# Patient Record
Sex: MALE | Race: AMERICAN INDIAN OR ALASKA NATIVE
[De-identification: names, ages, dates, MRNs, and addresses within clinical notes are randomized per-mention and may not be internally consistent; named-entity substitution may affect disease eponyms.]

---

## 2018-03-07 ENCOUNTER — HOSPITAL ENCOUNTER (INPATIENT)
Dept: HOSPITAL 42 - ED | Age: 58
LOS: 1 days | Discharge: ANOTHER HOSPITAL | DRG: 270 | End: 2018-03-08
Attending: INTERNAL MEDICINE | Admitting: INTERNAL MEDICINE
Payer: SELF-PAY

## 2018-03-07 VITALS — BODY MASS INDEX: 20.9 KG/M2

## 2018-03-07 DIAGNOSIS — E11.9: ICD-10-CM

## 2018-03-07 DIAGNOSIS — A41.9: ICD-10-CM

## 2018-03-07 DIAGNOSIS — I46.9: ICD-10-CM

## 2018-03-07 DIAGNOSIS — I10: ICD-10-CM

## 2018-03-07 DIAGNOSIS — R57.9: ICD-10-CM

## 2018-03-07 DIAGNOSIS — I25.10: ICD-10-CM

## 2018-03-07 DIAGNOSIS — J96.91: ICD-10-CM

## 2018-03-07 DIAGNOSIS — I21.4: Primary | ICD-10-CM

## 2018-03-07 DIAGNOSIS — I49.01: ICD-10-CM

## 2018-03-07 LAB
ALBUMIN SERPL-MCNC: 4.7 G/DL (ref 3–4.8)
ALBUMIN/GLOB SERPL: 1.2 {RATIO} (ref 1.1–1.8)
ALT SERPL-CCNC: 150 U/L (ref 7–56)
APPEARANCE UR: (no result)
APTT BLD: 30 SECONDS (ref 25.1–36.5)
ARTERIAL BLOOD GAS O2 SAT: 100.4 % (ref 95–98)
ARTERIAL BLOOD GAS PCO2: 35 MM/HG (ref 35–45)
ARTERIAL BLOOD GAS TCO2: 23.8 MMOL.L (ref 22–28)
AST SERPL-CCNC: 152 U/L (ref 17–59)
BASOPHILS # BLD AUTO: 0.04 K/MM3 (ref 0–2)
BASOPHILS NFR BLD: 0.3 % (ref 0–3)
BILIRUB UR-MCNC: (no result) MG/DL
BNP SERPL-MCNC: 162 PG/ML (ref 0–450)
BUN SERPL-MCNC: 16 MG/DL (ref 7–21)
CALCIUM SERPL-MCNC: 10.1 MG/DL (ref 8.4–10.5)
CK MB SERPL-MCNC: 6.4 NG/ML (ref 0–3.6)
CK MB%: 1.4 % (ref 2.5–3)
COLOR UR: YELLOW
EOSINOPHIL # BLD: 0.1 10*3/UL (ref 0–0.7)
EOSINOPHIL NFR BLD: 0.9 % (ref 1.5–5)
ERYTHROCYTE [DISTWIDTH] IN BLOOD BY AUTOMATED COUNT: 13.9 % (ref 11.5–14.5)
GFR NON-AFRICAN AMERICAN: > 60
GLUCOSE UR STRIP-MCNC: NEGATIVE MG/DL
GRANULOCYTES # BLD: 10.31 10*3/UL (ref 1.4–6.5)
GRANULOCYTES NFR BLD: 69.6 % (ref 50–68)
HCO3 BLDA-SCNC: 22.7 MMOL/L (ref 21–28)
HDLC SERPL-MCNC: 40 MG/DL (ref 29–60)
HGB BLD-MCNC: 11.8 G/DL (ref 14–18)
INHALED O2 CONCENTRATION: 100 %
INR PPP: 1.08 (ref 0.93–1.08)
INR PPP: 1.09 (ref 0.93–1.08)
LDLC SERPL-MCNC: 109 MG/DL (ref 0–129)
LEUKOCYTE ESTERASE UR-ACNC: (no result) LEU/UL
LYMPHOCYTES # BLD: 3.6 10*3/UL (ref 1.2–3.4)
LYMPHOCYTES NFR BLD AUTO: 24 % (ref 22–35)
MCH RBC QN AUTO: 32.2 PG (ref 25–35)
MCHC RBC AUTO-ENTMCNC: 33.9 G/DL (ref 31–37)
MCV RBC AUTO: 94.8 FL (ref 80–105)
MONOCYTES # BLD AUTO: 0.8 10*3/UL (ref 0.1–0.6)
MONOCYTES NFR BLD: 5.2 % (ref 1–6)
PH BLDA: 7.42 [PH] (ref 7.35–7.45)
PH UR STRIP: 6 [PH] (ref 4.7–8)
PLATELET # BLD: 278 10^3/UL (ref 120–450)
PMV BLD AUTO: 10.7 FL (ref 7–11)
PO2 BLDA: 511 MM/HG (ref 80–100)
PROT UR STRIP-MCNC: 30 MG/DL
PROTHROMBIN TIME: 12.3 SECONDS (ref 9.4–12.5)
PROTHROMBIN TIME: 12.4 SECONDS (ref 9.4–12.5)
RBC # BLD AUTO: 3.67 10^6/UL (ref 3.5–6.1)
RBC # UR STRIP: (no result) /UL
SP GR UR STRIP: >= 1.03 (ref 1–1.03)
TROPONIN I SERPL-MCNC: 0.15 NG/ML
UROBILINOGEN UR STRIP-ACNC: 0.2 E.U./DL
WBC # BLD AUTO: 14.8 10^3/UL (ref 4.5–11)

## 2018-03-07 PROCEDURE — 0BH17EZ INSERTION OF ENDOTRACHEAL AIRWAY INTO TRACHEA, VIA NATURAL OR ARTIFICIAL OPENING: ICD-10-PCS | Performed by: EMERGENCY MEDICINE

## 2018-03-07 PROCEDURE — 5A1945Z RESPIRATORY VENTILATION, 24-96 CONSECUTIVE HOURS: ICD-10-PCS | Performed by: EMERGENCY MEDICINE

## 2018-03-07 RX ADMIN — PROPOFOL PRN MLS/HR: 10 INJECTION, EMULSION INTRAVENOUS at 22:26

## 2018-03-07 RX ADMIN — HEPARIN SODIUM SCH MLS/HR: 10000 INJECTION, SOLUTION INTRAVENOUS at 21:30

## 2018-03-07 RX ADMIN — AMIODARONE HYDROCHLORIDE SCH MLS/HR: 1.8 INJECTION, SOLUTION INTRAVENOUS at 21:17

## 2018-03-07 RX ADMIN — PROPOFOL PRN MLS/HR: 10 INJECTION, EMULSION INTRAVENOUS at 19:47

## 2018-03-07 NOTE — CT
EXAM:

  CT Head Without Intravenous Contrast



EXAM DATE/TIME:

  3/7/2018 8:04 PM



CLINICAL HISTORY:

  57 years old, male; Signs and symptoms; Other: Unresponsive; intubated patient



TECHNIQUE:

  Axial computed tomography images of the head/brain without intravenous 

contrast.  All CT scans at this facility use one or more dose reduction 

techniques, viz.: automated exposure control; ma/kV adjustment per patient size 

(including targeted exams where dose is matched to indication; i.e. head); or 

iterative reconstruction technique.

  Coronal and sagittal reformatted images were created and reviewed.



COMPARISON:

  There are no prior studies for comparison.



FINDINGS:

  Brain:  There is prominence of sulci gyri and ventricles. There is no midline 

shift. There is patchy decreased attenuation in periventricular white matter. 

There is focal area decreased attenuation in the right internal capsule. There 

are no focal masses. There are no focal hemorrhages. Gray-white differentiation 

is visualized.

  Ventricles:  See above.

  Bones: Cranial vault is intact.

  Soft tissues:  unremarkable

  Sinuses:  There is mucoperiosteal thickening in ethmoid and maxillary sinuses

  Ears and mastoids: Middle ears and mastoids are unremarkable.There is streak 

artifact from an earring

  Orbits:  Orbital contents are unremarkable.

  Airway: There is an air-fluid level in the posterior nasopharynx.



IMPRESSION:  Mild atrophy; patchy decreased attenuation in periventricular 

white matter suggests small vessel disease; age indeterminate ischemic changes 

enter the right internal capsule; no bleed



If there suspicion for acute stroke, MRI may be helpful

## 2018-03-07 NOTE — CP.PCM.HP
<Ashish Whalen - Last Filed: 03/07/18 22:16>





History of Present Illness





- History of Present Illness


History of Present Illness: 





57 year old male with past medical history of ?HTN and ?DM presents to the 

hospital by EMS after being found down by his neighbor. According to his 

neighbor, he was shoveling snow outside and noticed the patient had collapsed. 

EMS was called. EMS arrived after an unknown time and patient received CPR and 

1 shock. Patient achieved ROSC and was brought to the ED. In the ED, patient 

was in respiratory distress and intubated. Questionable medical history 

obtained from neighbor, no family at bedside.  





Review of systems unobtainable due to acute condition. 








Present on Admission





- Present on Admission


Any Indicators Present on Admission: No





Review of Systems





- Review of Systems


Systems not reviewed;Unavailable: Intubated





Past Patient History





- Past Social History


Smoking Status: Unknown If Ever Smoked





- CARDIAC


Hx Cardiac Disorders: Yes


Hx Hypertension: Yes





- ENDOCRINE/METABOLIC


Hx Endocrine Disorders: Yes


Hx Diabetes Mellitus Type 2: Yes





- PSYCHIATRIC


Hx Substance Use: No





Meds


Allergies/Adverse Reactions: 


 Allergies











Allergy/AdvReac Type Severity Reaction Status Date / Time


 


Unobtainable Allergy   Verified 03/07/18 19:50














Physical Exam





- Constitutional


Appears: In Acute Distress





- Head Exam


Head Exam: ATRAUMATIC, NORMAL INSPECTION, NORMOCEPHALIC





- Eye Exam


Eye Exam: PERRL





- ENT Exam


ENT Exam: Mucous Membranes Moist, Normal Exam





- Neck Exam


Neck exam: Positive for: Normal Inspection.  Negative for: Lymphadenopathy





- Respiratory Exam


Respiratory Exam: Clear to Auscultation Bilateral.  absent: Rales, Rhonchi, 

Wheezes


Additional comments: 





Intubated





- Cardiovascular Exam


Cardiovascular Exam: RRR, +S1, +S2





- GI/Abdominal Exam


GI & Abdominal Exam: Firm.  absent: Normal Bowel Sounds





- Extremities Exam


Extremities exam: Positive for: normal inspection.  Negative for: calf 

tenderness, pedal edema





- Neurological Exam


Additional comments: 





Sedated





- Skin


Skin Exam: Intact, Normal Color, Warm





Results





- Vital Signs


Recent Vital Signs: 





 Last Vital Signs











Temp  97.9 F   03/07/18 20:00


 


Pulse  107 H  03/07/18 20:00


 


Resp  22   03/07/18 20:00


 


BP  166/100 H  03/07/18 20:00


 


Pulse Ox  100   03/07/18 20:00














- Labs


Result Diagrams: 


 03/07/18 19:24





 03/07/18 19:24


Labs: 





 Laboratory Results - last 24 hr











  03/07/18 03/07/18 03/07/18





  19:24 19:24 19:24


 


WBC  14.8 H  


 


RBC  3.67  


 


Hgb  11.8 L  


 


Hct  34.8 L  


 


MCV  94.8  


 


MCH  32.2  


 


MCHC  33.9  


 


RDW  13.9  


 


Plt Count  278  


 


MPV  10.7  


 


Gran %  69.6 H  


 


Lymph % (Auto)  24.0  


 


Mono % (Auto)  5.2  


 


Eos % (Auto)  0.9 L  


 


Baso % (Auto)  0.3  


 


Gran #  10.31 H  


 


Lymph # (Auto)  3.6 H  


 


Mono # (Auto)  0.8 H  


 


Eos # (Auto)  0.1  


 


Baso # (Auto)  0.04  


 


PT   12.3 


 


INR   1.08 


 


APTT   30.0 


 


Sodium    145


 


Potassium    4.1


 


Chloride    104


 


Carbon Dioxide    21


 


Anion Gap    24 H


 


BUN    16


 


Creatinine    1.0


 


Est GFR ( Amer)    > 60


 


Est GFR (Non-Af Amer)    > 60


 


Random Glucose    167 H


 


Calcium    10.1


 


Total Bilirubin    0.4


 


AST    152 H


 


ALT    150 H


 


Alkaline Phosphatase    142 H


 


Lactate Dehydrogenase    1004 H


 


Total Creatine Kinase    443 H


 


CK-MB (CK-2)    6.4 H


 


CK-MB (CK-2) %    1.4 L


 


Troponin I    0.15 H*


 


Total Protein    8.6 H


 


Albumin    4.7


 


Globulin    3.9


 


Albumin/Globulin Ratio    1.2














Assessment & Plan





- Assessment and Plan (Free Text)


Plan: 





57 year old male with questionable medical history of HTN and DM presents with 

hypoxemic respiratory failure secondary to NSTEMI. Cardiologist recommends ASA, 

plavix, heparin, amiodarone, and nitroglycerin drip. We will admit patient to 

the ICU and continue to monitor patient closely. 





Neuro: 


Head CT shows mild atrophy with small vessel disease and indeterminate ischemic 

changes. 


Sedated





Cardiology: 


NSTEMI


Hemodynamically stable


Maintain MAP >65


ASA, plavix, heparin drip, amiodarone, and nitroglycerin drip


Labetalol prn for elevated BP


Trend tropins


Echocardiogram


Cardiology consulted


Urine tox, BNP, Magnesium, and Phosphorus ordered 


Lipid panel


ABG pending





Pulmonology: 


Intubated


Hypoxemic respiratory failure


Rapid flu pending


Maintain O2 sat >90


ABG in AM


CXR in AM





GI: 


Shock liver, will trend LFTs


Protonix


NPO





ID:


Afebrile


Leukocytosis likely secondary to stress reaction from NSTEMI


Procal ordered 


Maintain normothermia





Endocrine:


HgA1c ordered


Glucose levels stable


Maintain normoglycemia





Nephrology:


Maintain euvolemia 


Replenish electrolytes as needed





Marlee, PGY-2





<Rhonda BOOKER,Juan M - Last Filed: 03/08/18 06:04>





Results





- Vital Signs


Recent Vital Signs: 





 Last Vital Signs











Temp  100.4 F H  03/08/18 05:40


 


Pulse  77   03/08/18 05:40


 


Resp  17   03/08/18 02:05


 


BP  106/63   03/08/18 05:38


 


Pulse Ox  99   03/08/18 05:40














- Labs


Result Diagrams: 


 03/07/18 19:24





 03/07/18 19:24


Labs: 





 Laboratory Results - last 24 hr











  03/07/18 03/07/18 03/07/18





  23:42 23:42 23:45


 


APTT   


 


pO2   


 


VBG pH   


 


VBG pCO2   


 


VBG HCO3   


 


VBG Total CO2   


 


VBG O2 Sat (Calc)   


 


VBG Base Excess   


 


VBG Potassium   


 


Sodium   


 


Chloride   


 


Glucose   


 


Lactate   


 


FiO2   


 


Troponin I   


 


Venous Blood Potassium   


 


Urine Color  Yellow  


 


Urine Appearance  Sl cloudy  


 


Urine pH  6.0  


 


Ur Specific Gravity  >= 1.030  


 


Urine Protein  30 H  


 


Urine Glucose (UA)  Negative  


 


Urine Ketones  15 H  


 


Urine Blood  Trace-intact H  


 


Urine Nitrate  Negative  


 


Urine Bilirubin  Small H  


 


Urine Urobilinogen  0.2  


 


Ur Leukocyte Esterase  Trace H  


 


Urine RBC  0 - 2  


 


Urine WBC  1 - 3  


 


Ur Epithelial Cells  0 - 2  


 


Urine Other  Usperm  


 


Urine Opiates Screen    Positive H


 


Urine Methadone Screen    Negative


 


Ur Barbiturates Screen    Negative


 


Ur Phencyclidine Scrn    Negative


 


Ur Amphetamines Screen    Negative


 


U Benzodiazepines Scrn    Negative


 


U Oth Cocaine Metabols    Negative


 


U Cannabinoids Screen    Negative


 


Influenza Typ A,B (EIA)   Negative for flu a/b 














  03/08/18 03/08/18 03/08/18





  00:30 01:10 03:35


 


APTT    73.6 H


 


pO2   27 L 


 


VBG pH   7.34 


 


VBG pCO2   52.0 


 


VBG HCO3   28.1 H 


 


VBG Total CO2   29.7 H 


 


VBG O2 Sat (Calc)   55.9 


 


VBG Base Excess   1.4 


 


VBG Potassium   4.2 


 


Sodium   140.0 


 


Chloride   104.0 


 


Glucose   160 H 


 


Lactate   4.2 H* 


 


FiO2   21.0 


 


Troponin I  6.90 H* D  


 


Venous Blood Potassium   4.2 


 


Urine Color   


 


Urine Appearance   


 


Urine pH   


 


Ur Specific Gravity   


 


Urine Protein   


 


Urine Glucose (UA)   


 


Urine Ketones   


 


Urine Blood   


 


Urine Nitrate   


 


Urine Bilirubin   


 


Urine Urobilinogen   


 


Ur Leukocyte Esterase   


 


Urine RBC   


 


Urine WBC   


 


Ur Epithelial Cells   


 


Urine Other   


 


Urine Opiates Screen   


 


Urine Methadone Screen   


 


Ur Barbiturates Screen   


 


Ur Phencyclidine Scrn   


 


Ur Amphetamines Screen   


 


U Benzodiazepines Scrn   


 


U Oth Cocaine Metabols   


 


U Cannabinoids Screen   


 


Influenza Typ A,B (EIA)   














Attending/Attestation





- Attestation


I have personally seen and examined this patient.: Yes


I have fully participated in the care of the patient.: Yes


I have reviewed all pertinent clinical information: Yes


Notes (Text): 

















-I agree with the above H&P completed by the resident physician with the 

following additions and/or changes:








-The patient is a 57 year old man with a history of diabetes mellitus and HTN 

who collapsed while shoveling his driveway earlier this evening (time patient 

down unknown). The event was witnessed by his neighbor, who subsequently called 

EMS. Upon EMS arrival, the patient was unresponsive and found to be in 

ventricular fibrillation. He was shocked in the field with ROSC. Of note, 

because the patient is currently intubated and sedated (with no family or 

friends at bedside), details of the history are limited and reliant mostly on 

EMS/ED notes. In the ED, the patient developed respiratory distress and agonal 

breathing, requiring intubation. Initial EKG showed ST-depressions in anterior 

and lateral leads (with no prior EKGs available for comparison). Initial trop 

was elevated at 0.15. Per cardiology (Dr. Gipson) recommendations, treatment for 

NSTEMI was initiated (including Plavix and Heparin drip). Of note, CT-head 

result showing, 'ischemia of indeterminate age', was reviewed and confirmed by 

Dr. Palma (neurology). Consequently, due to concerns for risk of hemorrhagic 

conversion of this possibly acute ischemic CVA, the initial plan was to 

discontinue Heparin drip. However, because the patients 2nd troponin increased 

markedly from 0.15 to 6.90, suspicion for ACS remains high and Heparin drip was 

continued. Serial trops/EKGs, 2D-echo have all been ordered. In addition, due 

to the patient's initial reported V-fib in the field, Amioadrone drip has been 

started. Empiric IV antibiotics have also been started and procalicitonin and 

cultures pending. Overnight assistance by Dr. Gipson and Dr. Palma greatly 

appreciated.      

















Critical Care Time Spent: 90-120minutes

## 2018-03-07 NOTE — ED PDOC
Arrival/HPI





- General


Chief Complaint: Altered Mental Status


Time Seen by Provider: 03/07/18 19:57


Historian: EMS





- History of Present Illness


Narrative History of Present Illness (Text): 


03/07/18 20:00


Pradeep Manning is a 57 year old male who presents to the Emergency department 

brought in by EMS status post possible cardiac arrest. As per history, patient 

was shoveling snow and collapsed. EMS was notified, patient was defibrillated 

once and CPR was initiated. Patient apparently responded, as per paramedic. On 

arrival to the Emergency department, patient was unresponsive with agonal 

respirations. Patient was placed on cardiac monitor and was emergently 

intubated. Only other collateral info obtained is that of patient being 

hypertensive and diabetic. Limited HPI and ROS secondary to patient's condition.


Time/Duration: Prior to Arrival


Symptom Onset: Sudden


Symptom Course: Unchanged


Severity Level: Severe


Context: Other (Shoveling snow)





Past Medical History





- Provider Review


Nursing Documentation Reviewed: Yes





- Cardiac


Hx Cardiac Disorders: Yes


Hx Hypertension: Yes





- Endocrine/Metabolic


Hx Endocrine Disorders: Yes


Hx Diabetes Mellitus Type 2: Yes





- Psychiatric


Hx Substance Use: No





Family/Social History





- Physician Review


Nursing Documentation Reviewed: Yes


Family/Social History: Unknown Family HX


Smoking Status: Unknown If Ever Smoked


Hx Alcohol Use: No


Hx Substance Use: No





Allergies/Home Meds


Allergies/Adverse Reactions: 


Allergies





Unobtainable Allergy (Verified 03/07/18 19:50)


 











Review of Systems





- Review of Systems


Systems not reviewed;Unavailable: Acuity of Condition





Physical Exam


Vital Signs Reviewed: Yes


Vital Signs











  Temp Pulse Resp BP Pulse Ox


 


 03/07/18 21:38   109 H   187/89 H 


 


 03/07/18 20:00  97.9 F  107 H  22  166/100 H  100











Temperature: Afebrile


Blood Pressure: Hypertensive


Pulse: Regular


Respiratory Rate: Mechanically Ventilated


Mental Status: Positive for: other (Sedated)





- Systems Exam


Head: Present: Atraumatic, Normocephalic


Pupils: Present: PERRL


Conjunctiva: Present: Normal


Mouth: Present: Moist Mucous Membranes


Pharnyx: Present: Other (ET tube in oropharynx)


Neck: Present: Normal Range of Motion, Other (Supple).  No: Meningeal Signs, 

MIDLINE TENDERNESS, Paraspinal Tenderness


Respiratory/Chest: Present: Clear to Auscultation, Good Air Exchange, Other (

Mechanically ventilated)


Cardiovascular: Present: Regular Rate and Rhythm, Normal S1, S2.  No: Murmurs


Abdomen: Present: Normal Bowel Sounds.  No: Tenderness, Distention, Peritoneal 

Signs


Upper Extremity: No: Cyanosis, Edema


Lower Extremity: No: Edema


Neurological: Present: Other (Sedated currently)


Skin: Present: Warm, Dry, Normal Color.  No: Rashes


Psychiatric: Present: Other (Sedated)





Medical Decision Making


ED Course and Treatment: 


03/07/18 20:00


Impression:


57 year old male brought in by EMS following possible cardiac arrest.





Plan:


-- CT Head w/o contrast


-- EKG


-- CXR


-- Labs, cardiac enzymes


-- Aspirin


-- Etomidate


-- Nitroglycerin


-- Reassess and disposition





Progress Notes:


Pt defibrillated once in the field. CPR initiated by EMS. As per paramedic pt 

responded to efforts. On arrival, pt unresponsive, placed on cardiac monitior, 

and emergently intubated. Pt moving all extremities, was sedated, as pt was 

agitated following intubation. Pt vitals remain stable. 





EKG performed, ST at 107 bpm. ST/T wave depressions anteriorly and inferioly. 

Isolated ST elevation. AVR. Sent to Dr. Gipson, cardiologist, states pt is NSTEMI 

at this time. Medications were ordered as per discussion with Dr. Gipson.





03/07/18 20:05


PROCEDURE: INTUBATION


Performed by the emergency provider


Consent: Discussion of the risks, benefits, and alternatives to the procedure, 

along with informed


consent was precluded by the urgency of the procedure and the patient condition.


Timeout: A timeout to verify the correct patient, procedure, and site was 

performed.


Indication: Agonal respirations


Pre-oxygenation: Bag-valve-mask


Medications: Etomidate. See MAR for details.


ETT Size: 7.5


Confirmation: Cords directly visualized as tube passed, good bilateral breath 

sounds, positive CO2


detector color change, tube fogging, adequate chest rise, improving pulse 

oximetry reading, improved


skin color, and absence of gastric sounds,.


ETT Secured: The cuff was inflated and the tube was secured appropriately at a 

distance of 23 cm at


the lip.


Post-Procedure: There were no immediate complications.


CXR Confirmation: Yes





Chest X-ray reviewed, ET tube above the sally, no acute processes.





03/07/18 21:51


Case discussed with medical resident and Dr. Ellis, intensivist. Pt will be 

admitted to cardiac care unit for further management. Dr. Ellis and medical 

resident in ER to evaluate pt.





- Lab Interpretations


Lab Results: 








 03/07/18 19:24 





 03/07/18 19:24 





 Lab Results





03/07/18 21:50: pCO2 35, pO2 511.0 H, HCO3 22.7, ABG pH 7.42, ABG Total CO2 23.8

, ABG O2 Saturation 100.4 H, ABG Base Excess -1.3, ABG Potassium 3.6, Glucose 

230 H, Lactate 2.8 H, Mechanical Rate 14, FiO2 100.0, Tidal Volume 600, PEEP 5, 

Sodium 140.0, Chloride 109.0 H, Arterial Blood Potassium 3.6


03/07/18 21:30: Ammonia 29


03/07/18 21:30: TSH 3rd Generation 1.15, Alcohol, Quantitative < 10


03/07/18 21:30: Phosphorus 2.1 L, Magnesium 2.0, NT-Pro-B Natriuret Pep 162, 

Triglycerides 110, Cholesterol 165, LDL Cholesterol Direct 109, HDL Cholesterol 

40


03/07/18 21:30: PT 12.4, INR 1.09 H


03/07/18 19:24: Sodium 145, Potassium 4.1, Chloride 104, Carbon Dioxide 21, 

Anion Gap 24 H, BUN 16, Creatinine 1.0, Est GFR (African Amer) > 60, Est GFR (

Non-Af Amer) > 60, Random Glucose 167 H, Calcium 10.1, Total Bilirubin 0.4, AST 

152 H,  H, Alkaline Phosphatase 142 H, Lactate Dehydrogenase 1004 H, 

Total Creatine Kinase 443 H, CK-MB (CK-2) 6.4 H, CK-MB (CK-2) % 1.4 L, Troponin 

I 0.15 H*, Total Protein 8.6 H, Albumin 4.7, Globulin 3.9, Albumin/Globulin 

Ratio 1.2


03/07/18 19:24: PT 12.3, INR 1.08, APTT 30.0


03/07/18 19:24: WBC 14.8 H, RBC 3.67, Hgb 11.8 L, Hct 34.8 L, MCV 94.8, MCH 32.2

, MCHC 33.9, RDW 13.9, Plt Count 278, MPV 10.7, Gran % 69.6 H, Lymph % (Auto) 

24.0, Mono % (Auto) 5.2, Eos % (Auto) 0.9 L, Baso % (Auto) 0.3, Gran # 10.31 H, 

Lymph # (Auto) 3.6 H, Mono # (Auto) 0.8 H, Eos # (Auto) 0.1, Baso # (Auto) 0.04








I have reviewed the lab results: Yes





- RAD Interpretation


Radiology Orders: 








03/07/18 19:57


CHEST PORTABLE [RAD] Stat 





03/07/18 20:04


HEAD W/O CONTRAST [CT] Stat 





03/08/18 05:00


CHEST PORTABLE [RAD] DAILY 











: ED Physician





- EKG Interpretation


Interpreted by ED Physician: Yes


Type: 12 lead EKG





- Medication Orders


Current Medication Orders: 








Aspirin (Aspirin Chewable)  81 mg PO DAILY JOYCELYN


Nitroglycerin/Dextrose (Nitroglycerin 50 Mg/250 Ml D5w)  50 mg in 250 mls @ 1.5 

mls/hr IV .Q24H PRN; Protocol; 5 MCG/MIN


   PRN Reason: Other


   Last Admin: 03/07/18 21:38  Dose: 1.5 mls/hr





eMAR Start Stop


 Document     03/07/18 21:38  RD  (Rec: 03/07/18 22:10  RD  [email or web address])


     Intravenous Solution


      Start Date                                 03/07/18


      Start Time                                 21:38


MAR Pulse and Blood Pressure


 Document     03/07/18 21:38  RD  (Rec: 03/07/18 22:10  RD  [email or web address])


     Pulse


      Pulse Rate (60-90 beats/min)               109


     Blood Pressure


      Blood Pressure (100//90 mm Hg)       187/89





Heparin Sodium/Sodium Chloride (Heparin 17970 Units/250ml 1/2 Normal Saline)  25

,000 units in 250 mls @ 8.437 mls/hr IV .Q24H JOYCELYN; 12 UNITS/KG/HR


   PRN Reason: Protocol


   Last Admin: 03/07/18 21:30  Dose: 8.437 mls/hr





eMAR Start Stop


 Document     03/07/18 21:30  RD  (Rec: 03/07/18 22:12  RD  [email or web address])


     Intravenous Solution


      Start Date                                 03/07/18


      Start Time                                 21:30


MAR aPTT


 Document     03/07/18 21:30  RD  (Rec: 03/07/18 22:12  RD  [email or web address])


     aPTT


      aPTT (secs)                                30





Amiodarone HCl/Dextrose (Nexterone 360 Mg In D5w 200 Ml (Premix))  360 mg in 

200 mls @ 33.333 mls/hr IV .Q6H JOYCELYN; 1 MG/MIN


   PRN Reason: Protocol


   Last Admin: 03/07/18 21:17  Dose: 33.333 mls/hr





eMAR Start Stop


 Document     03/07/18 21:17  RD  (Rec: 03/07/18 22:13  RD  [email or web address])


     Intravenous Solution


      Start Date                                 03/07/18


      Start Time                                 21:17





Potassium Phosphate 15 mmole/ (Sodium Chloride)  255 mls @ 42.5 mls/hr IVPB 

ONCE ONE


   Stop: 03/08/18 03:50


Sodium Chloride (Sodium Chloride 0.9%)  1,000 mls @ 100 mls/hr IV .Q10H JOYCELYN


Propofol (Diprivan)  1,000 mg in 100 mls @ 1.953 mls/hr IV .Q24H PRN; Protocol; 

5 MCG/KG/MIN


   PRN Reason: TITRATE PER MD ORDER


   Last Admin: 03/07/18 22:26  Dose: 5.858 mls/hr


   Comments: Titration dose clarified with ANITA Gallagher. MD aware of titration.





eMAR Start Stop


 Document     03/07/18 22:26  RD  (Rec: 03/07/18 22:29  RD  [email or web address])


     Intravenous Solution


      Start Date                                 03/07/18


      Start Time                                 22:26


Pisano Agitation Sedation


 Document     03/07/18 22:26  RD  (Rec: 03/07/18 22:29  RD  [email or web address])


     Pisano Agitation Sedation Scale


      Pisano Agitation Sedation Scale Score    +3 Very Agitated: Pulls or


                                                 removes tube(s) or catheter(s)


                                                 ; aggressive





Insulin Human Lispro (Humalog Low)  0 units SC ACHS JOYCELYN


   PRN Reason: Protocol


Labetalol HCl (Trandate)  5 mg IV Q6H PRN


   PRN Reason: SBP > 160


Metoprolol Tartrate (Lopressor)  50 mg NG BID JOYCELYN


Pantoprazole Sodium (Protonix Inj)  40 mg IVP DAILY JOYCELYN





Discontinued Medications





Aspirin (Aspirin)  325 mg NG ONCE STA


   Stop: 03/07/18 20:12


   Last Admin: 03/07/18 20:50  Dose: 325 mg





Clopidogrel Bisulfate (Plavix)  300 mg NG STAT STA


   Stop: 03/07/18 20:12


   Last Admin: 03/07/18 20:51  Dose: 300 mg





Etomidate (Amidate)  20 mg IVP STAT STA


   Stop: 03/07/18 20:06


Heparin Sodium (Porcine) (Heparin)  5,000 units IV ONCE STA


   PRN Reason: Protocol


   Stop: 03/07/18 20:13


   Last Admin: 03/07/18 21:27  Dose: 5,000 units





eMAR Start Stop


 Document     03/07/18 21:27  RD  (Rec: 03/07/18 21:42  RD  [email or web address])


     Intravenous Solution


      Start Date                                 03/07/18


      Start Time                                 21:27


      End Date                                   03/07/18


      End time                                   21:29


      Total Infusion Time                        2


MAR aPTT


 Document     03/07/18 21:27  RD  (Rec: 03/07/18 21:42  RD  [email or web address])


     aPTT


      aPTT (secs)                                30.0





Amiodarone HCl/Dextrose (Nexterone 150 Mg In Dextrose 100 Ml (Premix))  150 mg 

in 100 mls @ 600 mls/hr IVPB ONCE ONE


   PRN Reason: Protocol


   Stop: 03/07/18 20:22


   Last Admin: 03/07/18 21:05  Dose: 600 mls/hr





eMAR Start Stop


 Document     03/07/18 21:05  RD  (Rec: 03/07/18 22:11  RD  [email or web address])


     Intravenous Solution


      Start Date                                 03/07/18


      Start Time                                 21:05


      End Date                                   03/07/18


      End time                                   21:15


      Total Infusion Time                        10





Ketamine HCl (Ketalar)  70 mg IV ONCE ONE


   Stop: 03/07/18 20:06


Metoprolol Tartrate (Lopressor)  50 mg NG STAT STA


   Stop: 03/07/18 22:21











- Scribe Statement


The provider has reviewed the documentation as recorded by the Irais Finnegan





Provider Scribe Attestation:


All medical record entries made by the Scribe were at my direction and 

personally dictated by me. I have reviewed the chart and agree that the record 

accurately reflects my personal performance of the history, physical exam, 

medical decision making, and the department course for this patient. I have 

also personally directed, reviewed, and agree with the discharge instructions 

and disposition.








Disposition/Present on Arrival





- Present on Arrival


Any Indicators Present on Arrival: No


History of DVT/PE: No


History of Uncontrolled Diabetes: No


Urinary Catheter: No


History of Decub. Ulcer: No


History Surgical Site Infection Following: None





- Disposition


Have Diagnosis and Disposition been Completed?: Yes


Diagnosis: 


 Cardiac arrest, Acute coronary syndrome





Disposition: HOSPITALIZED


Disposition Time: 22:48


Patient Plan: Admission


Condition: CRITICAL


Forms:  [employer] (English)

## 2018-03-08 VITALS — TEMPERATURE: 100.2 F | RESPIRATION RATE: 15 BRPM

## 2018-03-08 VITALS — SYSTOLIC BLOOD PRESSURE: 176 MMHG | DIASTOLIC BLOOD PRESSURE: 59 MMHG

## 2018-03-08 VITALS — HEART RATE: 66 BPM

## 2018-03-08 VITALS — OXYGEN SATURATION: 100 %

## 2018-03-08 LAB
ALBUMIN SERPL-MCNC: 3.8 G/DL (ref 3–4.8)
ALBUMIN/GLOB SERPL: 1.1 {RATIO} (ref 1.1–1.8)
ALT SERPL-CCNC: 131 U/L (ref 7–56)
APTT BLD: 66.9 SECONDS (ref 25.1–36.5)
ARTERIAL BLOOD GAS HEMOGLOBIN: 11.5 G/DL (ref 11.7–17.4)
ARTERIAL BLOOD GAS HEMOGLOBIN: 11.7 G/DL (ref 11.7–17.4)
ARTERIAL BLOOD GAS O2 SAT: 94.2 % (ref 95–98)
ARTERIAL BLOOD GAS O2 SAT: 99.8 % (ref 95–98)
ARTERIAL BLOOD GAS PCO2: 27 MM/HG (ref 35–45)
ARTERIAL BLOOD GAS PCO2: 32 MM/HG (ref 35–45)
ARTERIAL BLOOD GAS TCO2: 23.2 MMOL.L (ref 22–28)
ARTERIAL BLOOD GAS TCO2: 23.4 MMOL.L (ref 22–28)
AST SERPL-CCNC: 172 U/L (ref 17–59)
BASE EXCESS BLDV CALC-SCNC: 1.4 MMOL/L (ref 0–2)
BUN SERPL-MCNC: 19 MG/DL (ref 7–21)
CALCIUM SERPL-MCNC: 9.8 MG/DL (ref 8.4–10.5)
EPI CELLS #/AREA URNS HPF: (no result) /HPF (ref 0–5)
ERYTHROCYTE [DISTWIDTH] IN BLOOD BY AUTOMATED COUNT: 14.1 % (ref 11.5–14.5)
GFR NON-AFRICAN AMERICAN: > 60
HCO3 BLDA-SCNC: 22.2 MMOL/L (ref 21–28)
HCO3 BLDA-SCNC: 22.6 MMOL/L (ref 21–28)
HEPATITIS A IGM: NEGATIVE
HEPATITIS B CORE AB: NEGATIVE
HEPATITIS C ANTIBODY: NEGATIVE
HGB BLD-MCNC: 12 G/DL (ref 14–18)
INHALED O2 CONCENTRATION: 50 %
INHALED O2 CONCENTRATION: 50 %
INR PPP: 1.21 (ref 0.93–1.08)
MCH RBC QN AUTO: 29.8 PG (ref 25–35)
MCHC RBC AUTO-ENTMCNC: 32.3 G/DL (ref 31–37)
MCV RBC AUTO: 92.1 FL (ref 80–105)
O2 CAP BLDA-SCNC: 15.7 ML/DL (ref 16–24)
O2 CAP BLDA-SCNC: 16.6 ML/DL (ref 16–24)
O2 CT BLDA-SCNC: 14.8 ML/DL (ref 15–23)
O2 CT BLDA-SCNC: 16.6 ML/DL (ref 15–23)
PH BLDA: 7.45 [PH] (ref 7.35–7.45)
PH BLDA: 7.53 [PH] (ref 7.35–7.45)
PH BLDV: 7.34 [PH] (ref 7.32–7.43)
PLATELET # BLD: 343 10^3/UL (ref 120–450)
PMV BLD AUTO: 10.9 FL (ref 7–11)
PO2 BLDA: 227 MM/HG (ref 80–100)
PO2 BLDA: 54 MM/HG (ref 80–100)
PROTHROMBIN TIME: 14 SECONDS (ref 9.4–12.5)
RBC # BLD AUTO: 4.03 10^6/UL (ref 3.5–6.1)
RBC #/AREA URNS HPF: (no result) /HPF (ref 0–2)
VENOUS BLOOD FIO2: 21 %
VENOUS BLOOD GAS PCO2: 52 (ref 40–60)
VENOUS BLOOD GAS PO2: 27 MM/HG (ref 30–55)
WBC # BLD AUTO: 21.2 10^3/UL (ref 4.5–11)

## 2018-03-08 PROCEDURE — B2111ZZ FLUOROSCOPY OF MULTIPLE CORONARY ARTERIES USING LOW OSMOLAR CONTRAST: ICD-10-PCS | Performed by: INTERNAL MEDICINE

## 2018-03-08 PROCEDURE — B2131ZZ FLUOROSCOPY OF MULTIPLE CORONARY ARTERY BYPASS GRAFTS USING LOW OSMOLAR CONTRAST: ICD-10-PCS | Performed by: INTERNAL MEDICINE

## 2018-03-08 PROCEDURE — B2151ZZ FLUOROSCOPY OF LEFT HEART USING LOW OSMOLAR CONTRAST: ICD-10-PCS | Performed by: INTERNAL MEDICINE

## 2018-03-08 PROCEDURE — 5A02210 ASSISTANCE WITH CARDIAC OUTPUT USING BALLOON PUMP, CONTINUOUS: ICD-10-PCS | Performed by: INTERNAL MEDICINE

## 2018-03-08 PROCEDURE — 4A023N7 MEASUREMENT OF CARDIAC SAMPLING AND PRESSURE, LEFT HEART, PERCUTANEOUS APPROACH: ICD-10-PCS | Performed by: INTERNAL MEDICINE

## 2018-03-08 RX ADMIN — PIPERACILLIN AND TAZOBACTAM SCH MLS/HR: 3; .375 INJECTION, POWDER, LYOPHILIZED, FOR SOLUTION INTRAVENOUS; PARENTERAL at 06:49

## 2018-03-08 RX ADMIN — AMIODARONE HYDROCHLORIDE SCH MLS/HR: 1.8 INJECTION, SOLUTION INTRAVENOUS at 02:33

## 2018-03-08 RX ADMIN — PROPOFOL PRN MLS/HR: 10 INJECTION, EMULSION INTRAVENOUS at 20:39

## 2018-03-08 RX ADMIN — INSULIN LISPRO SCH: 100 INJECTION, SOLUTION INTRAVENOUS; SUBCUTANEOUS at 06:00

## 2018-03-08 RX ADMIN — INSULIN LISPRO SCH: 100 INJECTION, SOLUTION INTRAVENOUS; SUBCUTANEOUS at 12:00

## 2018-03-08 RX ADMIN — INSULIN LISPRO SCH: 100 INJECTION, SOLUTION INTRAVENOUS; SUBCUTANEOUS at 18:38

## 2018-03-08 RX ADMIN — CEFEPIME SCH MLS/HR: 1 INJECTION, SOLUTION INTRAVENOUS at 09:43

## 2018-03-08 RX ADMIN — PIPERACILLIN AND TAZOBACTAM SCH MLS/HR: 3; .375 INJECTION, POWDER, LYOPHILIZED, FOR SOLUTION INTRAVENOUS; PARENTERAL at 02:00

## 2018-03-08 RX ADMIN — PROPOFOL PRN MLS/HR: 10 INJECTION, EMULSION INTRAVENOUS at 18:24

## 2018-03-08 RX ADMIN — PROPOFOL PRN MLS/HR: 10 INJECTION, EMULSION INTRAVENOUS at 18:26

## 2018-03-08 RX ADMIN — CEFEPIME SCH: 1 INJECTION, SOLUTION INTRAVENOUS at 17:41

## 2018-03-08 RX ADMIN — AMIODARONE HYDROCHLORIDE SCH MLS/HR: 1.8 INJECTION, SOLUTION INTRAVENOUS at 08:23

## 2018-03-08 RX ADMIN — PROPOFOL PRN MLS/HR: 10 INJECTION, EMULSION INTRAVENOUS at 03:45

## 2018-03-08 RX ADMIN — PROPOFOL PRN MLS/HR: 10 INJECTION, EMULSION INTRAVENOUS at 18:54

## 2018-03-08 RX ADMIN — HEPARIN SODIUM SCH MLS/HR: 10000 INJECTION, SOLUTION INTRAVENOUS at 20:41

## 2018-03-08 NOTE — CP.[email or web address].CON
History of Present Illness





- History of Present Illness


History of Present Illness: 





Neuro Consult note: 





57 year old male with PMHx of presumed HTN and DM? presents to the ED following 

being found outside by a neighbor shoveling snow. HPI obtained from prior notes 

and nursing staff as patient is intubated. As per neighbor he was shoveling his 

snow when he noticed the patient had collapsed. At this point EMS was called. 

Downtime was unknown and patient received CPR and 1 shock. At this point ROSC 

was achieved and he was brought to the ED. In the ED patient was found to be in 

respiratory distress and was intubated. CT of the head showed ischemic changes 

in the T internal capsule no bleed. Neuro was consulted. 





12 point ROS unobtainable as patient is intubated. 








 





Review of Systems





- Review of Systems


All systems: reviewed and no additional remarkable complaints except





Past Patient History





- Past Social History


Smoking Status: Unknown





- CARDIAC


Hx Cardiac Disorders: Yes (s/p Cardiac Arrest 03/07/18.)


Hx Cardia Arrhythmia: Yes (s/p Debrillation 03/07/18.)


Hx Hypertension: Yes (Hypertensive on admission 03/07/18.)





- PULMONARY


Hx Respiratory Disorders:  (Unknown.)





- NEUROLOGICAL


Hx Neurological Disorder: Yes (03/07/18 CT Head suggests small vessel disease.)





- HEENT


Hx HEENT Problems:  (Unknown)





- RENAL


Hx Chronic Kidney Disease:  (Unknown)





- ENDOCRINE/METABOLIC


Hx Endocrine Disorders: Yes


Hx Diabetes Mellitus Type 2: Yes





- HEMATOLOGICAL/ONCOLOGICAL


Hx Blood Disorders:  (Unknown)





- INTEGUMENTARY


Hx Dermatological Problems:  (Unknown)





- MUSCULOSKELETAL/RHEUMATOLOGICAL


Hx Musculoskeletal Disorders:  (Unknown)


Hx Falls: Yes (Current. Found down and unresponsive.)





- GASTROINTESTINAL


Hx Gastrointestinal Disorders:  (Unknown)





- GENITOURINARY/GYNECOLOGICAL


Hx Genitourinary Disorders:  (Unknown)





- PSYCHIATRIC


Hx Psychophysiologic Disorder:  (Unknown)


Hx Substance Use: No





- SURGICAL HISTORY


Hx Surgeries: Yes (Lower abdominal surgery (Lower abd midline healed scar noted)

.)





Meds


Allergies/Adverse Reactions: 


 Allergies











Allergy/AdvReac Type Severity Reaction Status Date / Time


 


Unobtainable Allergy   Verified 03/07/18 19:50














- Medications


Medications: 


 Current Medications





Aspirin (Aspirin Chewable)  81 mg PO DAILY [facility]


   Last Admin: 03/08/18 09:17 Dose:  81 mg


Clopidogrel Bisulfate (Plavix)  75 mg PO DAILY [facility]


   Last Admin: 03/08/18 09:18 Dose:  75 mg


Nitroglycerin/Dextrose (Nitroglycerin 50 Mg/250 Ml D5w)  50 mg in 250 mls @ 1.5 

mls/hr IV .Q24H PRN; Protocol; 5 MCG/MIN


   PRN Reason: Other


   Last Titration: 03/08/18 04:30 Dose:  0 mcg/min, 0 mls/hr


Heparin Sodium/Sodium Chloride (Heparin 63233 Units/250ml 1/2 Normal Saline)  25

,000 units in 250 mls @ 8.437 mls/hr IV .Q24H JOYCELYN; 12 UNITS/KG/HR


   PRN Reason: Protocol


   Last Admin: 03/07/18 21:30 Dose:  8.437 mls/hr


Amiodarone HCl/Dextrose (Nexterone 360 Mg In D5w 200 Ml (Premix))  360 mg in 

200 mls @ 33.333 mls/hr IV .Q6H JOYCELYN; 1 MG/MIN


   PRN Reason: Protocol


   Last Admin: 03/08/18 08:23 Dose:  16.7 mls/hr


Sodium Chloride (Sodium Chloride 0.9%)  1,000 mls @ 100 mls/hr IV .Q10H JOYCELYN


   Last Admin: 03/08/18 02:17 Dose:  100 mls/hr


Propofol (Diprivan)  1,000 mg in 100 mls @ 1.953 mls/hr IV .Q24H PRN; Protocol; 

5 MCG/KG/MIN


   PRN Reason: TITRATE PER MD ORDER


   Last Titration: 03/08/18 05:45 Dose:  35 mcg/kg/min, 13.669 mls/hr


Cefepime HCl (Maxipime 1gm)  1 gm in 100 mls @ 100 mls/hr IVPB Q8 JOYCELYN


   PRN Reason: Protocol


   Last Admin: 03/08/18 09:43 Dose:  100 mls/hr


Vancomycin HCl (Vancomycin 1gm)  1 gm in 250 mls @ 167 mls/hr IVPB Q12H JOYCELYN


   PRN Reason: Protocol


   Last Admin: 03/08/18 10:56 Dose:  167 mls/hr


Insulin Human Lispro (Humalog Low)  0 units SC Q6 JOYCELYN


   PRN Reason: Protocol


   Last Admin: 03/08/18 06:00 Dose:  Not Given


Labetalol HCl (Trandate)  5 mg IV Q6H PRN


   PRN Reason: SBP > 160


Metoprolol Tartrate (Lopressor)  50 mg NG BID [facility]


   Last Admin: 03/08/18 10:35 Dose:  Not Given


Pantoprazole Sodium (Protonix Inj)  40 mg IVP DAILY [facility]


   Last Admin: 03/08/18 09:29 Dose:  40 mg











Physical Exam





- Constitutional


Appears: No Acute Distress





- Head Exam


Head Exam: ATRAUMATIC, NORMOCEPHALIC





- Eye Exam


Eye Exam: EOMI, PERRL


Pupil Exam: NORMAL ACCOMODATION





- ENT Exam


ENT Exam: Mucous Membranes Moist





- Respiratory Exam


Respiratory Exam: Clear to Auscultation Bilateral.  absent: Rales, Wheezes





- Cardiovascular Exam


Cardiovascular Exam: REGULAR RHYTHM, RRR, +S1, +S2





- GI/Abdominal Exam


GI & Abdominal Exam: Normal Bowel Sounds, Soft.  absent: Tenderness





- Neurological Exam


Additional comments: 





Off sedation: + dolls eyes, corneal and gag reflexes intact, moves all 

extremities to pain, Pupils reactive but sluggish to light  





- Expanded Neurological Exam


  ** Expanded


Cranial nerves: Gag Reflex: Normal


Neuro motor strength exam: Left Upper Extremity: 4, Right Upper Extremity: 4, 

Left Lower Extremity: 4, Right Lower Extremity: 4


Coma Scale Eye Opening: To Voice


Coma Scale Motor Response: Localizes to Pain, Withdraws to Pain


Coma Scale Verbal: None


Coma Scale Total: 13





Results





- Vital Signs


Recent Vital Signs: 


 Last Vital Signs











Temp  99.3 F   03/08/18 09:00


 


Pulse  70   03/08/18 10:35


 


Resp  14   03/08/18 07:47


 


BP  99/60 L  03/08/18 10:35


 


Pulse Ox  99   03/08/18 09:00














- Labs


Result Diagrams: 


 03/08/18 05:30





 03/08/18 05:30


Labs: 


 Laboratory Results - last 24 hr











  03/07/18 03/07/18 03/07/18





  23:42 23:42 23:45


 


WBC   


 


RBC   


 


Hgb   


 


Hct   


 


MCV   


 


MCH   


 


MCHC   


 


RDW   


 


Plt Count   


 


MPV   


 


PT   


 


INR   


 


APTT   


 


pCO2   


 


pO2   


 


HCO3   


 


ABG pH   


 


ABG Total CO2   


 


ABG O2 Saturation   


 


ABG O2 Content   


 


ABG Base Excess   


 


ABG Hemoglobin   


 


ABG Carboxyhemoglobin   


 


POC ABG HHb (Measured)   


 


ABG Methemoglobin   


 


ABG O2 Capacity   


 


VBG pH   


 


VBG pCO2   


 


VBG HCO3   


 


VBG Total CO2   


 


VBG O2 Sat (Calc)   


 


VBG Base Excess   


 


VBG Potassium   


 


Hgb O2 Saturation   


 


Sodium   


 


Chloride   


 


Glucose   


 


Lactate   


 


FiO2   


 


Potassium   


 


Carbon Dioxide   


 


Anion Gap   


 


BUN   


 


Creatinine   


 


Est GFR ( Amer)   


 


Est GFR (Non-Af Amer)   


 


POC Glucose (mg/dL)   


 


Random Glucose   


 


Calcium   


 


Phosphorus   


 


Magnesium   


 


Total Bilirubin   


 


AST   


 


ALT   


 


Alkaline Phosphatase   


 


Troponin I   


 


Total Protein   


 


Albumin   


 


Globulin   


 


Albumin/Globulin Ratio   


 


Venous Blood Potassium   


 


Urine Color  Yellow  


 


Urine Appearance  Sl cloudy  


 


Urine pH  6.0  


 


Ur Specific Gravity  >= 1.030  


 


Urine Protein  30 H  


 


Urine Glucose (UA)  Negative  


 


Urine Ketones  15 H  


 


Urine Blood  Trace-intact H  


 


Urine Nitrate  Negative  


 


Urine Bilirubin  Small H  


 


Urine Urobilinogen  0.2  


 


Ur Leukocyte Esterase  Trace H  


 


Urine RBC  0 - 2  


 


Urine WBC  1 - 3  


 


Ur Epithelial Cells  0 - 2  


 


Urine Other  Usperm  


 


Urine Opiates Screen    Positive H


 


Urine Methadone Screen    Negative


 


Ur Barbiturates Screen    Negative


 


Ur Phencyclidine Scrn    Negative


 


Ur Amphetamines Screen    Negative


 


U Benzodiazepines Scrn    Negative


 


U Oth Cocaine Metabols    Negative


 


U Cannabinoids Screen    Negative


 


Influenza Typ A,B (EIA)   Negative for flu a/b 














  03/08/18 03/08/18 03/08/18





  00:30 01:10 03:35


 


WBC   


 


RBC   


 


Hgb   


 


Hct   


 


MCV   


 


MCH   


 


MCHC   


 


RDW   


 


Plt Count   


 


MPV   


 


PT   


 


INR   


 


APTT    73.6 H


 


pCO2   


 


pO2   27 L 


 


HCO3   


 


ABG pH   


 


ABG Total CO2   


 


ABG O2 Saturation   


 


ABG O2 Content   


 


ABG Base Excess   


 


ABG Hemoglobin   


 


ABG Carboxyhemoglobin   


 


POC ABG HHb (Measured)   


 


ABG Methemoglobin   


 


ABG O2 Capacity   


 


VBG pH   7.34 


 


VBG pCO2   52.0 


 


VBG HCO3   28.1 H 


 


VBG Total CO2   29.7 H 


 


VBG O2 Sat (Calc)   55.9 


 


VBG Base Excess   1.4 


 


VBG Potassium   4.2 


 


Hgb O2 Saturation   


 


Sodium   140.0 


 


Chloride   104.0 


 


Glucose   160 H 


 


Lactate   4.2 H* 


 


FiO2   21.0 


 


Potassium   


 


Carbon Dioxide   


 


Anion Gap   


 


BUN   


 


Creatinine   


 


Est GFR ( Amer)   


 


Est GFR (Non-Af Amer)   


 


POC Glucose (mg/dL)   


 


Random Glucose   


 


Calcium   


 


Phosphorus   


 


Magnesium   


 


Total Bilirubin   


 


AST   


 


ALT   


 


Alkaline Phosphatase   


 


Troponin I  6.90 H* D  


 


Total Protein   


 


Albumin   


 


Globulin   


 


Albumin/Globulin Ratio   


 


Venous Blood Potassium   4.2 


 


Urine Color   


 


Urine Appearance   


 


Urine pH   


 


Ur Specific Gravity   


 


Urine Protein   


 


Urine Glucose (UA)   


 


Urine Ketones   


 


Urine Blood   


 


Urine Nitrate   


 


Urine Bilirubin   


 


Urine Urobilinogen   


 


Ur Leukocyte Esterase   


 


Urine RBC   


 


Urine WBC   


 


Ur Epithelial Cells   


 


Urine Other   


 


Urine Opiates Screen   


 


Urine Methadone Screen   


 


Ur Barbiturates Screen   


 


Ur Phencyclidine Scrn   


 


Ur Amphetamines Screen   


 


U Benzodiazepines Scrn   


 


U Oth Cocaine Metabols   


 


U Cannabinoids Screen   


 


Influenza Typ A,B (EIA)   














  03/08/18 03/08/18 03/08/18





  05:30 05:30 05:39


 


WBC  21.2 H D  


 


RBC  4.03  


 


Hgb  12.0 L  


 


Hct  37.1 L  


 


MCV  92.1  


 


MCH  29.8  


 


MCHC  32.3  


 


RDW  14.1  


 


Plt Count  343  


 


MPV  10.9  


 


PT   


 


INR   


 


APTT   


 


pCO2   


 


pO2   


 


HCO3   


 


ABG pH   


 


ABG Total CO2   


 


ABG O2 Saturation   


 


ABG O2 Content   


 


ABG Base Excess   


 


ABG Hemoglobin   


 


ABG Carboxyhemoglobin   


 


POC ABG HHb (Measured)   


 


ABG Methemoglobin   


 


ABG O2 Capacity   


 


VBG pH   


 


VBG pCO2   


 


VBG HCO3   


 


VBG Total CO2   


 


VBG O2 Sat (Calc)   


 


VBG Base Excess   


 


VBG Potassium   


 


Hgb O2 Saturation   


 


Sodium   142 


 


Chloride   106 


 


Glucose   


 


Lactate   


 


FiO2   


 


Potassium   4.2 


 


Carbon Dioxide   23 


 


Anion Gap   17 


 


BUN   19 


 


Creatinine   0.8 


 


Est GFR ( Amer)   > 60 


 


Est GFR (Non-Af Amer)   > 60 


 


POC Glucose (mg/dL)    105


 


Random Glucose   189 H 


 


Calcium   9.8 


 


Phosphorus   


 


Magnesium   


 


Total Bilirubin   0.3 


 


AST   172 H 


 


ALT   131 H 


 


Alkaline Phosphatase   141 H 


 


Troponin I   


 


Total Protein   7.2 


 


Albumin   3.8 


 


Globulin   3.4 


 


Albumin/Globulin Ratio   1.1 


 


Venous Blood Potassium   


 


Urine Color   


 


Urine Appearance   


 


Urine pH   


 


Ur Specific Gravity   


 


Urine Protein   


 


Urine Glucose (UA)   


 


Urine Ketones   


 


Urine Blood   


 


Urine Nitrate   


 


Urine Bilirubin   


 


Urine Urobilinogen   


 


Ur Leukocyte Esterase   


 


Urine RBC   


 


Urine WBC   


 


Ur Epithelial Cells   


 


Urine Other   


 


Urine Opiates Screen   


 


Urine Methadone Screen   


 


Ur Barbiturates Screen   


 


Ur Phencyclidine Scrn   


 


Ur Amphetamines Screen   


 


U Benzodiazepines Scrn   


 


U Oth Cocaine Metabols   


 


U Cannabinoids Screen   


 


Influenza Typ A,B (EIA)   














  03/08/18 03/08/18 03/08/18





  05:45 05:55 06:30


 


WBC   


 


RBC   


 


Hgb   


 


Hct   


 


MCV   


 


MCH   


 


MCHC   


 


RDW   


 


Plt Count   


 


MPV   


 


PT   


 


INR   


 


APTT   


 


pCO2   32 L 


 


pO2   54.0 L 


 


HCO3   22.2 


 


ABG pH   7.45 


 


ABG Total CO2   23.2 


 


ABG O2 Saturation   94.2 L 


 


ABG O2 Content   14.8 L 


 


ABG Base Excess   -1.2 


 


ABG Hemoglobin   11.5 L 


 


ABG Carboxyhemoglobin   1.8 H 


 


POC ABG HHb (Measured)   5.6 H 


 


ABG Methemoglobin   1.1 


 


ABG O2 Capacity   15.7 L 


 


VBG pH   


 


VBG pCO2   


 


VBG HCO3   


 


VBG Total CO2   


 


VBG O2 Sat (Calc)   


 


VBG Base Excess   


 


VBG Potassium   


 


Hgb O2 Saturation   91.4 L 


 


Sodium   


 


Chloride   


 


Glucose   


 


Lactate   


 


FiO2   50.0 


 


Potassium   


 


Carbon Dioxide   


 


Anion Gap   


 


BUN   


 


Creatinine   


 


Est GFR ( Amer)   


 


Est GFR (Non-Af Amer)   


 


POC Glucose (mg/dL)   


 


Random Glucose   


 


Calcium   


 


Phosphorus    2.1 L


 


Magnesium    1.9


 


Total Bilirubin   


 


AST   


 


ALT   


 


Alkaline Phosphatase   


 


Troponin I  14.30 H* D  


 


Total Protein   


 


Albumin   


 


Globulin   


 


Albumin/Globulin Ratio   


 


Venous Blood Potassium   


 


Urine Color   


 


Urine Appearance   


 


Urine pH   


 


Ur Specific Gravity   


 


Urine Protein   


 


Urine Glucose (UA)   


 


Urine Ketones   


 


Urine Blood   


 


Urine Nitrate   


 


Urine Bilirubin   


 


Urine Urobilinogen   


 


Ur Leukocyte Esterase   


 


Urine RBC   


 


Urine WBC   


 


Ur Epithelial Cells   


 


Urine Other   


 


Urine Opiates Screen   


 


Urine Methadone Screen   


 


Ur Barbiturates Screen   


 


Ur Phencyclidine Scrn   


 


Ur Amphetamines Screen   


 


U Benzodiazepines Scrn   


 


U Oth Cocaine Metabols   


 


U Cannabinoids Screen   


 


Influenza Typ A,B (EIA)   














  03/08/18 03/08/18 03/08/18





  06:51 09:30 11:37


 


WBC   


 


RBC   


 


Hgb   


 


Hct   


 


MCV   


 


MCH   


 


MCHC   


 


RDW   


 


Plt Count   


 


MPV   


 


PT   14.0 H 


 


INR   1.21 H 


 


APTT   66.9 H 


 


pCO2  27 L  


 


pO2  227.0 H  


 


HCO3  22.6  


 


ABG pH  7.53 H  


 


ABG Total CO2  23.4  


 


ABG O2 Saturation  99.8 H  


 


ABG O2 Content  16.6  


 


ABG Base Excess  0.8  


 


ABG Hemoglobin  11.7  


 


ABG Carboxyhemoglobin  1.2  


 


POC ABG HHb (Measured)  0.2  


 


ABG Methemoglobin  1.2  


 


ABG O2 Capacity  16.6  


 


VBG pH   


 


VBG pCO2   


 


VBG HCO3   


 


VBG Total CO2   


 


VBG O2 Sat (Calc)   


 


VBG Base Excess   


 


VBG Potassium   


 


Hgb O2 Saturation  97.4  


 


Sodium   


 


Chloride   


 


Glucose   


 


Lactate   


 


FiO2  50.0  


 


Potassium   


 


Carbon Dioxide   


 


Anion Gap   


 


BUN   


 


Creatinine   


 


Est GFR ( Amer)   


 


Est GFR (Non-Af Amer)   


 


POC Glucose (mg/dL)    100


 


Random Glucose   


 


Calcium   


 


Phosphorus   


 


Magnesium   


 


Total Bilirubin   


 


AST   


 


ALT   


 


Alkaline Phosphatase   


 


Troponin I   


 


Total Protein   


 


Albumin   


 


Globulin   


 


Albumin/Globulin Ratio   


 


Venous Blood Potassium   


 


Urine Color   


 


Urine Appearance   


 


Urine pH   


 


Ur Specific Gravity   


 


Urine Protein   


 


Urine Glucose (UA)   


 


Urine Ketones   


 


Urine Blood   


 


Urine Nitrate   


 


Urine Bilirubin   


 


Urine Urobilinogen   


 


Ur Leukocyte Esterase   


 


Urine RBC   


 


Urine WBC   


 


Ur Epithelial Cells   


 


Urine Other   


 


Urine Opiates Screen   


 


Urine Methadone Screen   


 


Ur Barbiturates Screen   


 


Ur Phencyclidine Scrn   


 


Ur Amphetamines Screen   


 


U Benzodiazepines Scrn   


 


U Oth Cocaine Metabols   


 


U Cannabinoids Screen   


 


Influenza Typ A,B (EIA)   














Assessment & Plan





- Assessment and Plan (Free Text)


Assessment: 





57 year old male with PMHx of presumed HTN and DM? presents to the ED following 

cardiac arrest. S/p CPR and 1 shock, currently intubated and sedated. 





- CT head showed ischemic changes in R internal capsule, no bleed 


- Consider MRI of brain once stable 


- F/u Echo 


- Carotid US 


- EEG ordered 


- F/u cardiology recs - Possible cardiac cath today - Currently on Amio drip, 

asa, plavix and heparin drip. 


- Cont Aspirin and plavix 


- Maintain systolic <160 


- PT and OT 





Case and plan was reviewed and discussed with Dr Palma.

## 2018-03-08 NOTE — CP.PCM.DIS
<Liban Goel - Last Filed: 03/08/18 17:32>





Provider





- Provider


Date of Admission: 


03/07/18 22:45





Attending physician: 


Juan Mathew MD





Consults: 





Cardio: Brandan


Neuro: Louie


ICU


Time Spent in preparation of Discharge (in minutes): 35





Diagnosis





- Discharge Diagnosis


(1) NSTEMI (non-ST elevated myocardial infarction)


Status: Acute   Priority: High   





(2) Coronary artery disease involving left main coronary artery


Status: Acute   Priority: High   


Comment: Left main occlusion on Cath   





(3) Acute coronary syndrome


Status: Acute   Priority: High   





(4) Cardiac arrest


Status: Resolved   Priority: High   





Hospital Course





- Lab Results


Lab Results: 


 Most Recent Lab Values











WBC  21.2 10^3/ul (4.5-11.0)  H D 03/08/18  05:30    


 


RBC  4.03 10^6/uL (3.5-6.1)   03/08/18  05:30    


 


Hgb  12.0 g/dL (14.0-18.0)  L  03/08/18  05:30    


 


Hct  37.1 % (42.0-52.0)  L  03/08/18  05:30    


 


MCV  92.1 fl (80.0-105.0)   03/08/18  05:30    


 


MCH  29.8 pg (25.0-35.0)   03/08/18  05:30    


 


MCHC  32.3 g/dl (31.0-37.0)   03/08/18  05:30    


 


RDW  14.1 % (11.5-14.5)   03/08/18  05:30    


 


Plt Count  343 10^3/uL (120.0-450.0)   03/08/18  05:30    


 


MPV  10.9 fl (7.0-11.0)   03/08/18  05:30    


 


Gran %  69.6 % (50.0-68.0)  H  03/07/18  19:24    


 


Lymph % (Auto)  24.0 % (22.0-35.0)   03/07/18  19:24    


 


Mono % (Auto)  5.2 % (1.0-6.0)   03/07/18  19:24    


 


Eos % (Auto)  0.9 % (1.5-5.0)  L  03/07/18  19:24    


 


Baso % (Auto)  0.3 % (0.0-3.0)   03/07/18  19:24    


 


Gran #  10.31  (1.4-6.5)  H  03/07/18  19:24    


 


Lymph # (Auto)  3.6  (1.2-3.4)  H  03/07/18  19:24    


 


Mono # (Auto)  0.8  (0.1-0.6)  H  03/07/18  19:24    


 


Eos # (Auto)  0.1  (0.0-0.7)   03/07/18  19:24    


 


Baso # (Auto)  0.04 K/mm3 (0.0-2.0)   03/07/18  19:24    


 


PT  14.0 SECONDS (9.4-12.5)  H  03/08/18  09:30    


 


INR  1.21  (0.93-1.08)  H  03/08/18  09:30    


 


APTT  66.9 Seconds (25.1-36.5)  H  03/08/18  09:30    


 


pCO2  27 mm/Hg (35-45)  L  03/08/18  06:51    


 


pO2  227.0 mm/Hg ()  H  03/08/18  06:51    


 


HCO3  22.6 mmol/L (21-28)   03/08/18  06:51    


 


ABG pH  7.53  (7.35-7.45)  H  03/08/18  06:51    


 


ABG Total CO2  23.4 mmol.L (22-28)   03/08/18  06:51    


 


ABG O2 Saturation  99.8 % (95-98)  H  03/08/18  06:51    


 


ABG O2 Content  16.6 ML/dl (15-23)   03/08/18  06:51    


 


ABG Base Excess  0.8 mmol/L (-2.0-3.0)   03/08/18  06:51    


 


ABG Hemoglobin  11.7 g/dL (11.7-17.4)   03/08/18  06:51    


 


ABG Carboxyhemoglobin  1.2 % (0.5-1.5)   03/08/18  06:51    


 


POC ABG HHb (Measured)  0.2 % (0-5)   03/08/18  06:51    


 


ABG Methemoglobin  1.2 % (0.0-3.0)   03/08/18  06:51    


 


ABG O2 Capacity  16.6 mL/dl (16-24)   03/08/18  06:51    


 


ABG Potassium  3.6 mmol/L (3.6-5.2)   03/07/18  21:50    


 


VBG pH  7.34  (7.32-7.43)   03/08/18  01:10    


 


VBG pCO2  52.0  (40-60)   03/08/18  01:10    


 


VBG HCO3  28.1 mmol/l (21-28)  H  03/08/18  01:10    


 


VBG Total CO2  29.7 mmol.L (22-28)  H  03/08/18  01:10    


 


VBG O2 Sat (Calc)  55.9 % (40-65)   03/08/18  01:10    


 


VBG Base Excess  1.4 mmol/L (0.0-2.0)   03/08/18  01:10    


 


VBG Potassium  4.2 mmol/L (3.6-5.2)   03/08/18  01:10    


 


Hgb O2 Saturation  97.4 % (95.0-98.0)   03/08/18  06:51    


 


Sodium  140.0 mmol/L (132-148)   03/08/18  01:10    


 


Chloride  104.0 mmol/L ()   03/08/18  01:10    


 


Glucose  160 mg/dl ()  H  03/08/18  01:10    


 


Lactate  4.2 mmol/L (0.7-2.1)  H*  03/08/18  01:10    


 


Mechanical Rate  14   03/07/18  21:50    


 


FiO2  50.0 %  03/08/18  06:51    


 


Tidal Volume  600   03/07/18  21:50    


 


PEEP  5   03/07/18  21:50    


 


Sodium  142 mmol/L (132-148)   03/08/18  05:30    


 


Potassium  4.2 mmol/L (3.6-5.0)   03/08/18  05:30    


 


Chloride  106 mmol/L ()   03/08/18  05:30    


 


Carbon Dioxide  23 mmol/L (21-33)   03/08/18  05:30    


 


Anion Gap  17  (10-20)   03/08/18  05:30    


 


BUN  19 mg/dL (7-21)   03/08/18  05:30    


 


Creatinine  0.8 mg/dl (0.8-1.5)   03/08/18  05:30    


 


Est GFR ( Amer)  > 60   03/08/18  05:30    


 


Est GFR (Non-Af Amer)  > 60   03/08/18  05:30    


 


POC Glucose (mg/dL)  100 mg/dL ()   03/08/18  11:37    


 


Random Glucose  189 mg/dL ()  H  03/08/18  05:30    


 


Hemoglobin A1c  6.4 % (4.2-6.5)   03/07/18  21:30    


 


Calcium  9.8 mg/dL (8.4-10.5)   03/08/18  05:30    


 


Phosphorus  2.1 mg/dL (2.5-4.5)  L  03/08/18  06:30    


 


Magnesium  1.9 mg/dL (1.7-2.2)   03/08/18  06:30    


 


Total Bilirubin  0.3 mg/dL (0.2-1.3)   03/08/18  05:30    


 


AST  172 U/L (17-59)  H  03/08/18  05:30    


 


ALT  131 U/L (7-56)  H  03/08/18  05:30    


 


Alkaline Phosphatase  141 U/L ()  H  03/08/18  05:30    


 


Ammonia  29 umol/L (9-33)   03/07/18  21:30    


 


Lactate Dehydrogenase  1004 U/L (333-699)  H  03/07/18  19:24    


 


Total Creatine Kinase  443 U/L ()  H  03/07/18  19:24    


 


CK-MB (CK-2)  6.4 ng/mL (0.0-3.6)  H  03/07/18  19:24    


 


CK-MB (CK-2) %  1.4 % (2.5-3.0)  L  03/07/18  19:24    


 


Troponin I  14.30 ng/mL H* D 03/08/18  05:45    


 


NT-Pro-B Natriuret Pep  162 pg/mL (0-450)   03/07/18  21:30    


 


Total Protein  7.2 g/dL (5.8-8.3)   03/08/18  05:30    


 


Albumin  3.8 g/dL (3.0-4.8)   03/08/18  05:30    


 


Globulin  3.4 gm/dL  03/08/18  05:30    


 


Albumin/Globulin Ratio  1.1  (1.1-1.8)   03/08/18  05:30    


 


Triglycerides  110 mg/dL ()   03/07/18  21:30    


 


Cholesterol  165 mg/dL (130-200)   03/07/18  21:30    


 


LDL Cholesterol Direct  109 mg/dL (0-129)   03/07/18  21:30    


 


HDL Cholesterol  40 mg/dL (29-60)   03/07/18  21:30    


 


Procalcitonin  < 0.05 NG/ML (0.19-0.49)  L  03/07/18  21:30    


 


TSH 3rd Generation  1.15 mIU/mL (0.46-4.68)   03/07/18  21:30    


 


Arterial Blood Potassium  3.6 mmol/L (3.6-5.2)   03/07/18  21:50    


 


Venous Blood Potassium  4.2 mmol/L (3.6-5.2)   03/08/18  01:10    


 


Urine Color  Yellow  (YELLOW)   03/07/18  23:42    


 


Urine Appearance  Sl cloudy  (CLEAR)   03/07/18  23:42    


 


Urine pH  6.0  (4.7-8.0)   03/07/18  23:42    


 


Ur Specific Gravity  >= 1.030  (1.005-1.035)   03/07/18  23:42    


 


Urine Protein  30 mg/dL (<30 mg/dL)  H  03/07/18  23:42    


 


Urine Glucose (UA)  Negative mg/dL (NEGATIVE)   03/07/18  23:42    


 


Urine Ketones  15 mg/dL (NEGATIVE)  H  03/07/18  23:42    


 


Urine Blood  Trace-intact  (NEGATIVE)  H  03/07/18  23:42    


 


Urine Nitrate  Negative  (NEGATIVE)   03/07/18  23:42    


 


Urine Bilirubin  Small  (NEGATIVE)  H  03/07/18  23:42    


 


Urine Urobilinogen  0.2 E.U./dL (<1 E.U./dL)   03/07/18  23:42    


 


Ur Leukocyte Esterase  Trace Sudha/uL (NEGATIVE)  H  03/07/18  23:42    


 


Urine RBC  0 - 2 /hpf (0-2)   03/07/18  23:42    


 


Urine WBC  1 - 3 /hpf (0-6)   03/07/18  23:42    


 


Ur Epithelial Cells  0 - 2 /hpf (0-5)   03/07/18  23:42    


 


Urine Other  Usperm   03/07/18  23:42    


 


Urine Opiates Screen  Positive  (NEGATIVE)  H  03/07/18  23:45    


 


Urine Methadone Screen  Negative  (NEGATIVE)   03/07/18  23:45    


 


Ur Barbiturates Screen  Negative  (NEGATIVE)   03/07/18  23:45    


 


Ur Phencyclidine Scrn  Negative  (NEGATIVE)   03/07/18  23:45    


 


Ur Amphetamines Screen  Negative  (NEGATIVE)   03/07/18  23:45    


 


U Benzodiazepines Scrn  Negative  (NEGATIVE)   03/07/18  23:45    


 


U Oth Cocaine Metabols  Negative  (NEGATIVE)   03/07/18  23:45    


 


U Cannabinoids Screen  Negative  (NEGATIVE)   03/07/18  23:45    


 


Alcohol, Quantitative  < 10 mg/dL (0-10)   03/07/18  21:30    


 


Hepatitis A IgM Ab  Negative  (NEGATIVE)   03/07/18  21:30    


 


Hep Bs Antigen  Negative  (NEGATIVE)   03/07/18  21:30    


 


Hep B Core IgM Ab  Negative  (NEGATIVE)   03/07/18  21:30    


 


Hepatitis C Antibody  Negative  (NEGATIVE)   03/07/18  21:30    


 


Influenza Typ A,B (EIA)  Negative for flu a/b  (NEGATIVE)   03/07/18  23:42    














- Hospital Course


Hospital Course: 





This is a 56 yo AA M with PMH of HTN, DM, and (as per ex-girlfriend whom he has 

kids with) polysubstance abuse including cocaine and heroin who was brought in 

by ambulance after he collapsed outside while shoveling snow (witnessed).  EMS 

responded, found the patient to be in Ventricular Fibrillation, and ROSC was 

obtained after initiation of CPR and 1x shock.  After arrival, patient was 

determined to have had an NSTEMI (trop 0.16, no ST changes on EKG), and was 

started on anticoagulation with heparin drip and dual antiplatelets as per 

Cardiology, and was transferred to the ICU as he remained unresponsive, 

intubated (for airway protection), and on a ventilator.  Today, patient 

continued to have elevated troponins, last one 14.3, and was then taken for 

Cardiac Cath (code heart called).  As a result of the cardiac cath, he was 

found to have Left main coronary artery occlusion, not amenable to stenting, 

requiring the patient to undergo CABG.  An aortic balloon pump was also placed.

  [facility] was contacted for transfer and possible CABG

, and accepted the patient, pending bed availability.  Case was discussed with 

family present, and with son Pradeep over the phone, and an EMTALA form was 

filled out, with kristel Fernández consenting over telephone for transfer after 

review of risks and benefits of transfer and possible procedure (witnessed by 

Nursing).  Patient remained on 1:1 monitoring until time of transfer, and was 

then transferred to [facility].





Patient seen and examined with attending, Dr. Mathew.





Discharge Exam





- Head Exam


Head Exam: ATRAUMATIC, NORMAL INSPECTION, NORMOCEPHALIC





- Eye Exam


Eye Exam: EOMI.  absent: Conjunctival injection, Scleral icterus


Pupil Exam: absent: Irregular, Unequal


Additional comments: 





On initial exam, pupils were pinpoint and non-responsive to light challenge; on 

repeat exam, pupils were more dilated (but not grossly dilated) and equally 

reactive to light bilaterally





- ENT Exam


ENT Exam: Mucous Membranes Moist


Additional comments: 





ET tube in place, secured with peres





- Respiratory Exam


Respiratory Exam: Clear to PA & Lateral.  absent: Accessory Muscle Use, 

Decreased Breath Sounds, Rales, Rhonchi, Wheezes


Additional comments: 





Intubated and mechanically ventilated, on PRVC mode, overbreathing the 

ventilator





- Cardiovascular Exam


Cardiovascular Exam: REGULAR RHYTHM, RRR, +S1, +S2.  absent: Bradycardia, 

Tachycardia, Diastolic murmur, Irregular Rhythm, JVD, +S4, Systolic Murmur





- GI/Abdominal Exam


GI & Abdominal Exam: Normal Bowel Sounds, Soft.  absent: Diminished Bowel Sounds

, Distended, Firm, Hyperactive Bowel Sounds, Hypoactive Bowel Sounds, Rigid





- Extremities Exam


Additional comments: 





No pitting edema, erythema, or gross asymmetry in bilateral LE


+2 radial pulses bilaterally





- Neurological Exam


Additional comments: 





sedated on propofol drip at 35 mcg/kg/min


Pupillary reactions as documented above


No spontaneous movements or reactions noted, not withdrawing from pain, no 

reaction to verbal stimuli


GCS 3 (M1, E1, V1t)





- Psychiatric Exam


Additional comments: 





unable to assess due to sedation





- Skin


Skin Exam: Dry, Intact, Normal Color, Warm





Discharge Plan





- Follow Up Plan


Condition: CRITICAL


Disposition: Transfer [facility]





<Juan Mathew - Last Filed: 03/09/18 11:55>





Provider





- Provider


Date of Admission: 


03/07/18 22:45





Attending physician: 


Juan Mathew MD








Hospital Course





- Lab Results


Lab Results: 


 Micro Results





03/07/18 23:45   Urine,Clean Catch   Urine Culture - Final


                            No Growth (<1,000 CFU/ML)





 Most Recent Lab Values











WBC  21.2 10^3/ul (4.5-11.0)  H D 03/08/18  05:30    


 


RBC  4.03 10^6/uL (3.5-6.1)   03/08/18  05:30    


 


Hgb  12.0 g/dL (14.0-18.0)  L  03/08/18  05:30    


 


Hct  37.1 % (42.0-52.0)  L  03/08/18  05:30    


 


MCV  92.1 fl (80.0-105.0)   03/08/18  05:30    


 


MCH  29.8 pg (25.0-35.0)   03/08/18  05:30    


 


MCHC  32.3 g/dl (31.0-37.0)   03/08/18  05:30    


 


RDW  14.1 % (11.5-14.5)   03/08/18  05:30    


 


Plt Count  343 10^3/uL (120.0-450.0)   03/08/18  05:30    


 


MPV  10.9 fl (7.0-11.0)   03/08/18  05:30    


 


Gran %  69.6 % (50.0-68.0)  H  03/07/18  19:24    


 


Lymph % (Auto)  24.0 % (22.0-35.0)   03/07/18  19:24    


 


Mono % (Auto)  5.2 % (1.0-6.0)   03/07/18  19:24    


 


Eos % (Auto)  0.9 % (1.5-5.0)  L  03/07/18  19:24    


 


Baso % (Auto)  0.3 % (0.0-3.0)   03/07/18  19:24    


 


Gran #  10.31  (1.4-6.5)  H  03/07/18  19:24    


 


Lymph # (Auto)  3.6  (1.2-3.4)  H  03/07/18  19:24    


 


Mono # (Auto)  0.8  (0.1-0.6)  H  03/07/18  19:24    


 


Eos # (Auto)  0.1  (0.0-0.7)   03/07/18  19:24    


 


Baso # (Auto)  0.04 K/mm3 (0.0-2.0)   03/07/18  19:24    


 


PT  14.0 SECONDS (9.4-12.5)  H  03/08/18  09:30    


 


INR  1.21  (0.93-1.08)  H  03/08/18  09:30    


 


APTT  66.9 Seconds (25.1-36.5)  H  03/08/18  09:30    


 


pCO2  27 mm/Hg (35-45)  L  03/08/18  06:51    


 


pO2  227.0 mm/Hg ()  H  03/08/18  06:51    


 


HCO3  22.6 mmol/L (21-28)   03/08/18  06:51    


 


ABG pH  7.53  (7.35-7.45)  H  03/08/18  06:51    


 


ABG Total CO2  23.4 mmol.L (22-28)   03/08/18  06:51    


 


ABG O2 Saturation  99.8 % (95-98)  H  03/08/18  06:51    


 


ABG O2 Content  16.6 ML/dl (15-23)   03/08/18  06:51    


 


ABG Base Excess  0.8 mmol/L (-2.0-3.0)   03/08/18  06:51    


 


ABG Hemoglobin  11.7 g/dL (11.7-17.4)   03/08/18  06:51    


 


ABG Carboxyhemoglobin  1.2 % (0.5-1.5)   03/08/18  06:51    


 


POC ABG HHb (Measured)  0.2 % (0-5)   03/08/18  06:51    


 


ABG Methemoglobin  1.2 % (0.0-3.0)   03/08/18  06:51    


 


ABG O2 Capacity  16.6 mL/dl (16-24)   03/08/18  06:51    


 


ABG Potassium  3.6 mmol/L (3.6-5.2)   03/07/18  21:50    


 


VBG pH  7.34  (7.32-7.43)   03/08/18  01:10    


 


VBG pCO2  52.0  (40-60)   03/08/18  01:10    


 


VBG HCO3  28.1 mmol/l (21-28)  H  03/08/18  01:10    


 


VBG Total CO2  29.7 mmol.L (22-28)  H  03/08/18  01:10    


 


VBG O2 Sat (Calc)  55.9 % (40-65)   03/08/18  01:10    


 


VBG Base Excess  1.4 mmol/L (0.0-2.0)   03/08/18  01:10    


 


VBG Potassium  4.2 mmol/L (3.6-5.2)   03/08/18  01:10    


 


Hgb O2 Saturation  97.4 % (95.0-98.0)   03/08/18  06:51    


 


Sodium  140.0 mmol/L (132-148)   03/08/18  01:10    


 


Chloride  104.0 mmol/L ()   03/08/18  01:10    


 


Glucose  160 mg/dl ()  H  03/08/18  01:10    


 


Lactate  4.2 mmol/L (0.7-2.1)  H*  03/08/18  01:10    


 


Mechanical Rate  14   03/07/18  21:50    


 


FiO2  50.0 %  03/08/18  06:51    


 


Tidal Volume  600   03/07/18  21:50    


 


PEEP  5   03/07/18  21:50    


 


Sodium  142 mmol/L (132-148)   03/08/18  05:30    


 


Potassium  4.2 mmol/L (3.6-5.0)   03/08/18  05:30    


 


Chloride  106 mmol/L ()   03/08/18  05:30    


 


Carbon Dioxide  23 mmol/L (21-33)   03/08/18  05:30    


 


Anion Gap  17  (10-20)   03/08/18  05:30    


 


BUN  19 mg/dL (7-21)   03/08/18  05:30    


 


Creatinine  0.8 mg/dl (0.8-1.5)   03/08/18  05:30    


 


Est GFR ( Amer)  > 60   03/08/18  05:30    


 


Est GFR (Non-Af Amer)  > 60   03/08/18  05:30    


 


POC Glucose (mg/dL)  81 mg/dL ()   03/08/18  19:43    


 


Random Glucose  189 mg/dL ()  H  03/08/18  05:30    


 


Hemoglobin A1c  6.4 % (4.2-6.5)   03/07/18  21:30    


 


Calcium  9.8 mg/dL (8.4-10.5)   03/08/18  05:30    


 


Phosphorus  2.1 mg/dL (2.5-4.5)  L  03/08/18  06:30    


 


Magnesium  1.9 mg/dL (1.7-2.2)   03/08/18  06:30    


 


Total Bilirubin  0.3 mg/dL (0.2-1.3)   03/08/18  05:30    


 


AST  172 U/L (17-59)  H  03/08/18  05:30    


 


ALT  131 U/L (7-56)  H  03/08/18  05:30    


 


Alkaline Phosphatase  141 U/L ()  H  03/08/18  05:30    


 


Ammonia  29 umol/L (9-33)   03/07/18  21:30    


 


Lactate Dehydrogenase  1004 U/L (333-699)  H  03/07/18  19:24    


 


Total Creatine Kinase  443 U/L ()  H  03/07/18  19:24    


 


CK-MB (CK-2)  6.4 ng/mL (0.0-3.6)  H  03/07/18  19:24    


 


CK-MB (CK-2) %  1.4 % (2.5-3.0)  L  03/07/18  19:24    


 


Troponin I  14.30 ng/mL H* D 03/08/18  05:45    


 


NT-Pro-B Natriuret Pep  162 pg/mL (0-450)   03/07/18  21:30    


 


Total Protein  7.2 g/dL (5.8-8.3)   03/08/18  05:30    


 


Albumin  3.8 g/dL (3.0-4.8)   03/08/18  05:30    


 


Globulin  3.4 gm/dL  03/08/18  05:30    


 


Albumin/Globulin Ratio  1.1  (1.1-1.8)   03/08/18  05:30    


 


Triglycerides  110 mg/dL ()   03/07/18  21:30    


 


Cholesterol  165 mg/dL (130-200)   03/07/18  21:30    


 


LDL Cholesterol Direct  109 mg/dL (0-129)   03/07/18  21:30    


 


HDL Cholesterol  40 mg/dL (29-60)   03/07/18  21:30    


 


Procalcitonin  < 0.05 NG/ML (0.19-0.49)  L  03/07/18  21:30    


 


TSH 3rd Generation  1.15 mIU/mL (0.46-4.68)   03/07/18  21:30    


 


Arterial Blood Potassium  3.6 mmol/L (3.6-5.2)   03/07/18  21:50    


 


Venous Blood Potassium  4.2 mmol/L (3.6-5.2)   03/08/18  01:10    


 


Urine Color  Yellow  (YELLOW)   03/07/18  23:42    


 


Urine Appearance  Sl cloudy  (CLEAR)   03/07/18  23:42    


 


Urine pH  6.0  (4.7-8.0)   03/07/18  23:42    


 


Ur Specific Gravity  >= 1.030  (1.005-1.035)   03/07/18  23:42    


 


Urine Protein  30 mg/dL (<30 mg/dL)  H  03/07/18  23:42    


 


Urine Glucose (UA)  Negative mg/dL (NEGATIVE)   03/07/18  23:42    


 


Urine Ketones  15 mg/dL (NEGATIVE)  H  03/07/18  23:42    


 


Urine Blood  Trace-intact  (NEGATIVE)  H  03/07/18  23:42    


 


Urine Nitrate  Negative  (NEGATIVE)   03/07/18  23:42    


 


Urine Bilirubin  Small  (NEGATIVE)  H  03/07/18  23:42    


 


Urine Urobilinogen  0.2 E.U./dL (<1 E.U./dL)   03/07/18  23:42    


 


Ur Leukocyte Esterase  Trace Sudha/uL (NEGATIVE)  H  03/07/18  23:42    


 


Urine RBC  0 - 2 /hpf (0-2)   03/07/18  23:42    


 


Urine WBC  1 - 3 /hpf (0-6)   03/07/18  23:42    


 


Ur Epithelial Cells  0 - 2 /hpf (0-5)   03/07/18  23:42    


 


Urine Other  Usperm   03/07/18  23:42    


 


Urine Opiates Screen  Positive  (NEGATIVE)  H  03/07/18  23:45    


 


Urine Methadone Screen  Negative  (NEGATIVE)   03/07/18  23:45    


 


Ur Barbiturates Screen  Negative  (NEGATIVE)   03/07/18  23:45    


 


Ur Phencyclidine Scrn  Negative  (NEGATIVE)   03/07/18  23:45    


 


Ur Amphetamines Screen  Negative  (NEGATIVE)   03/07/18  23:45    


 


U Benzodiazepines Scrn  Negative  (NEGATIVE)   03/07/18  23:45    


 


U Oth Cocaine Metabols  Negative  (NEGATIVE)   03/07/18  23:45    


 


U Cannabinoids Screen  Negative  (NEGATIVE)   03/07/18  23:45    


 


Alcohol, Quantitative  < 10 mg/dL (0-10)   03/07/18  21:30    


 


Hepatitis A IgM Ab  Negative  (NEGATIVE)   03/07/18  21:30    


 


Hep Bs Antigen  Negative  (NEGATIVE)   03/07/18  21:30    


 


Hep B Core IgM Ab  Negative  (NEGATIVE)   03/07/18  21:30    


 


Hepatitis C Antibody  Negative  (NEGATIVE)   03/07/18  21:30    


 


Influenza Typ A,B (EIA)  Negative for flu a/b  (NEGATIVE)   03/07/18  23:42    














Attending/Attestation





- Attestation


I have personally seen and examined this patient.: Yes


I have fully participated in the care of the patient.: Yes


I have reviewed all pertinent clinical information, including history, physical 

exam and plan: Yes


Notes (Text): 





03/09/18 11:49


Medical record note made by the resident after discussion with my direction and 

input after the patient was personally seen and examined by me. I have reviewed 

the chart and agree that the record accurately reflects by personal performance 

of the history, physical exam, data review, and medical decision-making, in the 

course for the patient. I have also personally directed the plan of care.





57 yrs old male with SP cardiac arrest due to AF,SP CPR and SP intubation fop 

Respiratory failure and NSEMI.He underwent cardiac cauterization today that 

revealed critical Left main stenosis and LAD and circumflex stenosis.He will be 

transferred to [facility] for CABAG.

## 2018-03-08 NOTE — CARD
--------------- APPROVED REPORT --------------





EKG Measurement

Heart [id number]

CO [id number]

[id number]

[id number]

[id number]



<Conclusion>

Normal sinus rhythm

Prolonged QT

Abnormal ECG

## 2018-03-08 NOTE — CARDCATH
PROCEDURE DATE:  03/08/2018



HISTORY:  The patient is a 57-year-old male, was shoveling snow and was

found to have a cardiac arrest.  He was defibrillated and underwent CPR in

the field and is brought to the emergency room last night.  In the

emergency room last night, he was found to have ST depressions across his

anterior precordium.  He was intubated and was treated with antiarrhythmics

as well as heparin.



In the morning, there was difficulty in getting the patient off the

respirator.  There was evidence for his hemodynamic collapse as ischemic

based.  He was brought over to the cath lab for a code heart with an

emergency cardiac catheterization.



PROCEDURE:  Left heart catheterization with coronary arteriography and left

ventriculogram followed by intra-aortic balloon pump placement.  The right

femoral artery was cannulated with a 6-Yi sheath.  I performed moderate

sedation, which included the presence of an independent trained observer

that assisted in monitoring the patient's level of consciousness and

physiologic status.  After administration of _____, my intraservice time

was 30 minutes.



The findings on catheterization revealed a left ventricle that contracted

normally.  Estimated ejection fraction of 55%-60%.  The patient had a left

dominant circulation.  The RCA revealed diffuse atherosclerosis throughout

its course with a 60% stenosis in the proximal portion.



The left main artery was a short vessel and was involved with critical

lesions in the ostial LAD as well as the ostial circumflex artery. 

Estimated lesion stenosis is approximately 90%.



In the midportion of the circumflex, there was also a 70%-80% stenosis

noted.



An intra-aortic balloon pump was placed to help protect the patient's

ischemia as well as his hemodynamics.



The patient tolerated the procedure well and was transferred to the CCU

with a balloon pump and IV heparin.



In summary, the procedure revealed critical left main stenoses with an

ostial LAD and circumflex artery stenoses as well as triple vessel disease

of the mid circumflex artery and a nondominant RCA.



LV function is normal.



Given these findings, I have contacted the surgeons at [facility]. 

The patient will be transferred emergently to [facility] for coronary artery

bypass surgery.







__________________________________________

Uday Gipson MD



DD:  03/08/2018 14:57:41

DT:  03/08/2018 15:03:10

[facility] # [id number]

## 2018-03-08 NOTE — RAD
HISTORY:

Intubated  



COMPARISON:

Portable chest 03/07/2018. 



FINDINGS:

Endotracheal tube appears unchanged in position with nasogastric 

further advanced into the stomach. 



LUNGS:

No active pulmonary disease.



PLEURA:

No significant pleural effusion identified, no pneumothorax apparent.



CARDIOVASCULAR:

Normal.



OSSEOUS STRUCTURES:

No significant abnormalities.



VISUALIZED UPPER ABDOMEN:

Normal.



OTHER FINDINGS:

None.



IMPRESSION:

No acute infiltrate or pleural effusion identified. No significant 

interval change from prior chest 03/07/2018.

## 2018-03-08 NOTE — CP.CCUPN
<Trent Archer - Last Filed: 03/08/18 10:08>





CCU Subjective





- Physician Review


Subjective (Free Text): 





Trent Archer PGY1 ICU Progress Note for Dr. Small





Patient was seen and examined in ICU. He remains sedated and intubated. He is 

arousable but not able to follow simple commands. 


His son Everardo was bedside this morning and states that the patient lives with 

a fiance who recently had a stroke and requires a home health aid. He does not 

have any contact information for the fiance. He is also not aware of any 

medical history that his dad has, or any prior drug use. 





CCU Objective





- Vital Signs / Intake & Output


Vital Signs (Last 4 hours): 


Vital Signs











  Temp Pulse BP Pulse Ox


 


 03/08/18 06:08  100.0 F H  73  104/59 L  99


 


 03/08/18 06:00  100.2 F H  74   99


 


 03/08/18 05:53  100.2 F H  75  99/61 L  99


 


 03/08/18 05:50  100.4 F H  76   99


 


 03/08/18 05:40  100.4 F H  77   99


 


 03/08/18 05:38  100.4 F H  77  106/63  99


 


 03/08/18 05:30  100.4 F H  76   99


 


 03/08/18 05:23  100.6 F H  75  125/67  100


 


 03/08/18 05:20  100.6 F H  74   100


 


 03/08/18 05:10  100.4 F H  100 H   100


 


 03/08/18 05:08  100.4 F H  81  132/72  100


 


 03/08/18 05:00  100.4 F H  71   99


 


 03/08/18 04:53  100.4 F H  72  113/69  100


 


 03/08/18 04:50  100.4 F H  71   100


 


 03/08/18 04:40  100.4 F H  72   100


 


 03/08/18 04:38  100.4 F H  72  115/73  100


 


 03/08/18 04:30  100.4 F H  74   99


 


 03/08/18 04:23  100.4 F H  74  105/62  99


 


 03/08/18 04:20  100.4 F H  73   99


 


 03/08/18 04:10  100.4 F H  77   100


 


 03/08/18 04:08  100.6 F H  75  118/68  100


 


 03/08/18 04:00  100.6 F H  74   99


 


 03/08/18 03:53  100.6 F H  75  107/69  99


 


 03/08/18 03:50  100.6 F H  75   99


 


 03/08/18 03:40  100.6 F H  73   100


 


 03/08/18 03:38  100.6 F H  74  119/70  100


 


 03/08/18 03:30  100.6 F H  77   99


 


 03/08/18 03:23  100.6 F H  77  108/70  99


 


 03/08/18 03:20  100.6 F H  77   99


 


 03/08/18 03:10  100.8 F H  77   100


 


 03/08/18 03:08  100.8 F H  78  115/72  99


 


 03/08/18 03:00  100.9 F H  76   100











Intake and Output (Last 8hrs): 


 Intake & Output











 03/07/18 03/07/18 03/08/18





 14:59 22:59 06:59


 


Intake Total   69


 


Balance   69


 


Weight   133 lb 11.2 oz


 


Intake:   


 


  IV   69














- Physical Exam


Head: Positive for: Atraumatic, Normocephalic


Pupils: Positive for: PERRL


Conjunctiva: Positive for: Normal


Mouth: Positive for: Dry


Pharnyx: Positive for: Other (ET tube in oropharynx)


Neck: Negative for: Meningeal Signs, MIDLINE TENDERNESS, Paraspinal Tenderness


Respiratory/Chest: Positive for: Clear to Auscultation, Good Air Exchange, 

Other (Mechanically ventilated).  Negative for: Wheezes, Rales, Rhonchi


Cardiovascular: Positive for: Regular Rate and Rhythm, Normal S1, S2.  Negative 

for: Murmurs


Abdomen: Positive for: Normal Bowel Sounds.  Negative for: Tenderness, 

Distention, Peritoneal Signs


Upper Extremity: Positive for: Normal Inspection.  Negative for: Cyanosis, Edema


Lower Extremity: Positive for: Normal Inspection.  Negative for: Edema


Neurological: Positive for: Other (Sedated currently).  Negative for: GCS=15, 

CN II-XII Intact, Speech Normal


Skin: Positive for: Warm, Dry, Normal Color.  Negative for: Rashes


Psychiatric: Positive for: Other (Sedated)





- Medications


Active Medications: 


Active Medications











Generic Name Dose Route Start Last Admin





  Trade Name Freq  PRN Reason Stop Dose Admin


 


Aspirin  81 mg  03/08/18 10:00  





  Aspirin Chewable  PO   





  DAILY JOYCELYN   


 


Nitroglycerin/Dextrose  50 mg in 250 mls @ 1.5 mls/hr  03/07/18 19:57  03/08/18 

04:30





  Nitroglycerin 50 Mg/250 Ml D5w  IV   0 mcg/min





  .Q24H PRN   0 mls/hr





  Other   Titration





  Protocol   





  5 MCG/MIN   


 


Heparin Sodium/Sodium Chloride  25,000 units in 250 mls @ 8.437 mls/hr  03/07/ 18 20:15  03/07/18 21:30





  Heparin 25324 Units/250ml 1/2 Normal Saline  IV   8.437 mls/hr





  .Q24H JOYCELYN   Administration





  Protocol   





  12 UNITS/KG/HR   


 


Amiodarone HCl/Dextrose  360 mg in 200 mls @ 33.333 mls/hr  03/07/18 20:15  03/ 08/18 02:33





  Nexterone 360 Mg In D5w 200 Ml (Premix)  IV   33.333 mls/hr





  .Q6H JOYCELYN   Administration





  Protocol   





  1 MG/MIN   


 


Sodium Chloride  1,000 mls @ 100 mls/hr  03/07/18 22:15  03/08/18 02:17





  Sodium Chloride 0.9%  IV   100 mls/hr





  .Q10H JOYCELYN   Administration


 


Propofol  1,000 mg in 100 mls @ 1.953 mls/hr  03/07/18 19:46  03/08/18 05:45





  Diprivan  IV   35 mcg/kg/min





  .Q24H PRN   13.669 mls/hr





  TITRATE PER MD ORDER   Titration





  Protocol   





  5 MCG/KG/MIN   


 


Insulin Human Lispro  0 units  03/08/18 06:00  03/08/18 06:00





  Humalog Low  SC   Not Given





  Q6 JOYCELYN   





  Protocol   


 


Labetalol HCl  5 mg  03/07/18 21:24  





  Trandate  IV   





  Q6H PRN   





  SBP > 160   


 


Metoprolol Tartrate  50 mg  03/08/18 10:00  





  Lopressor  NG   





  BID JOYCELYN   


 


Pantoprazole Sodium  40 mg  03/08/18 10:00  





  Protonix Inj  IVP   





  DAILY JOYCELYN   














- Patient Studies


Lab Studies: 


 Lab Studies











  03/08/18 03/08/18 03/08/18 Range/Units





  05:55 03:35 01:10 


 


APTT   73.6 H   (25.1-36.5)  Seconds


 


pCO2  32 L    (35-45)  mm/Hg


 


pO2  54.0 L   27 L  (30-55)  mm/Hg


 


HCO3  22.2    (21-28)  mmol/L


 


ABG pH  7.45    (7.35-7.45)  


 


ABG Total CO2  23.2    (22-28)  mmol.L


 


ABG O2 Saturation  94.2 L    (95-98)  %


 


ABG O2 Content  14.8 L    (15-23)  ML/dl


 


ABG Base Excess  -1.2    (-2.0-3.0)  mmol/L


 


ABG Hemoglobin  11.5 L    (11.7-17.4)  g/dL


 


ABG Carboxyhemoglobin  1.8 H    (0.5-1.5)  %


 


POC ABG HHb (Measured)  5.6 H    (0-5)  %


 


ABG Methemoglobin  1.1    (0.0-3.0)  %


 


ABG O2 Capacity  15.7 L    (16-24)  mL/dl


 


VBG pH    7.34  (7.32-7.43)  


 


VBG pCO2    52.0  (40-60)  


 


VBG HCO3    28.1 H  (21-28)  mmol/l


 


VBG Total CO2    29.7 H  (22-28)  mmol.L


 


VBG O2 Sat (Calc)    55.9  (40-65)  %


 


VBG Base Excess    1.4  (0.0-2.0)  mmol/L


 


VBG Potassium    4.2  (3.6-5.2)  mmol/L


 


Hgb O2 Saturation  91.4 L    (95.0-98.0)  %


 


Sodium    140.0  (132-148)  mmol/L


 


Chloride    104.0  ()  mmol/L


 


Glucose    160 H  ()  mg/dl


 


Lactate    4.2 H*  (0.7-2.1)  mmol/L


 


FiO2  50.0   21.0  %


 


Troponin I     ng/mL


 


Venous Blood Potassium    4.2  (3.6-5.2)  mmol/L


 


Urine Color     (YELLOW)  


 


Urine Appearance     (CLEAR)  


 


Urine pH     (4.7-8.0)  


 


Ur Specific Gravity     (1.005-1.035)  


 


Urine Protein     (<30 mg/dL)  mg/dL


 


Urine Glucose (UA)     (NEGATIVE)  mg/dL


 


Urine Ketones     (NEGATIVE)  mg/dL


 


Urine Blood     (NEGATIVE)  


 


Urine Nitrate     (NEGATIVE)  


 


Urine Bilirubin     (NEGATIVE)  


 


Urine Urobilinogen     (<1 E.U./dL)  E.U./dL


 


Ur Leukocyte Esterase     (NEGATIVE)  Sudha/uL


 


Urine RBC     (0-2)  /hpf


 


Urine WBC     (0-6)  /hpf


 


Ur Epithelial Cells     (0-5)  /hpf


 


Urine Other     


 


Urine Opiates Screen     (NEGATIVE)  


 


Urine Methadone Screen     (NEGATIVE)  


 


Ur Barbiturates Screen     (NEGATIVE)  


 


Ur Phencyclidine Scrn     (NEGATIVE)  


 


Ur Amphetamines Screen     (NEGATIVE)  


 


U Benzodiazepines Scrn     (NEGATIVE)  


 


U Oth Cocaine Metabols     (NEGATIVE)  


 


U Cannabinoids Screen     (NEGATIVE)  


 


Influenza Typ A,B (EIA)     (NEGATIVE)  














  03/08/18 03/07/18 03/07/18 Range/Units





  00:30 23:45 23:42 


 


APTT     (25.1-36.5)  Seconds


 


pCO2     (35-45)  mm/Hg


 


pO2     (30-55)  mm/Hg


 


HCO3     (21-28)  mmol/L


 


ABG pH     (7.35-7.45)  


 


ABG Total CO2     (22-28)  mmol.L


 


ABG O2 Saturation     (95-98)  %


 


ABG O2 Content     (15-23)  ML/dl


 


ABG Base Excess     (-2.0-3.0)  mmol/L


 


ABG Hemoglobin     (11.7-17.4)  g/dL


 


ABG Carboxyhemoglobin     (0.5-1.5)  %


 


POC ABG HHb (Measured)     (0-5)  %


 


ABG Methemoglobin     (0.0-3.0)  %


 


ABG O2 Capacity     (16-24)  mL/dl


 


VBG pH     (7.32-7.43)  


 


VBG pCO2     (40-60)  


 


VBG HCO3     (21-28)  mmol/l


 


VBG Total CO2     (22-28)  mmol.L


 


VBG O2 Sat (Calc)     (40-65)  %


 


VBG Base Excess     (0.0-2.0)  mmol/L


 


VBG Potassium     (3.6-5.2)  mmol/L


 


Hgb O2 Saturation     (95.0-98.0)  %


 


Sodium     (132-148)  mmol/L


 


Chloride     ()  mmol/L


 


Glucose     ()  mg/dl


 


Lactate     (0.7-2.1)  mmol/L


 


FiO2     %


 


Troponin I  6.90 H* D    ng/mL


 


Venous Blood Potassium     (3.6-5.2)  mmol/L


 


Urine Color     (YELLOW)  


 


Urine Appearance     (CLEAR)  


 


Urine pH     (4.7-8.0)  


 


Ur Specific Gravity     (1.005-1.035)  


 


Urine Protein     (<30 mg/dL)  mg/dL


 


Urine Glucose (UA)     (NEGATIVE)  mg/dL


 


Urine Ketones     (NEGATIVE)  mg/dL


 


Urine Blood     (NEGATIVE)  


 


Urine Nitrate     (NEGATIVE)  


 


Urine Bilirubin     (NEGATIVE)  


 


Urine Urobilinogen     (<1 E.U./dL)  E.U./dL


 


Ur Leukocyte Esterase     (NEGATIVE)  Sudha/uL


 


Urine RBC     (0-2)  /hpf


 


Urine WBC     (0-6)  /hpf


 


Ur Epithelial Cells     (0-5)  /hpf


 


Urine Other     


 


Urine Opiates Screen   Positive H   (NEGATIVE)  


 


Urine Methadone Screen   Negative   (NEGATIVE)  


 


Ur Barbiturates Screen   Negative   (NEGATIVE)  


 


Ur Phencyclidine Scrn   Negative   (NEGATIVE)  


 


Ur Amphetamines Screen   Negative   (NEGATIVE)  


 


U Benzodiazepines Scrn   Negative   (NEGATIVE)  


 


U Oth Cocaine Metabols   Negative   (NEGATIVE)  


 


U Cannabinoids Screen   Negative   (NEGATIVE)  


 


Influenza Typ A,B (EIA)    Negative for flu a/b  (NEGATIVE)  














  03/07/18 Range/Units





  23:42 


 


APTT   (25.1-36.5)  Seconds


 


pCO2   (35-45)  mm/Hg


 


pO2   (30-55)  mm/Hg


 


HCO3   (21-28)  mmol/L


 


ABG pH   (7.35-7.45)  


 


ABG Total CO2   (22-28)  mmol.L


 


ABG O2 Saturation   (95-98)  %


 


ABG O2 Content   (15-23)  ML/dl


 


ABG Base Excess   (-2.0-3.0)  mmol/L


 


ABG Hemoglobin   (11.7-17.4)  g/dL


 


ABG Carboxyhemoglobin   (0.5-1.5)  %


 


POC ABG HHb (Measured)   (0-5)  %


 


ABG Methemoglobin   (0.0-3.0)  %


 


ABG O2 Capacity   (16-24)  mL/dl


 


VBG pH   (7.32-7.43)  


 


VBG pCO2   (40-60)  


 


VBG HCO3   (21-28)  mmol/l


 


VBG Total CO2   (22-28)  mmol.L


 


VBG O2 Sat (Calc)   (40-65)  %


 


VBG Base Excess   (0.0-2.0)  mmol/L


 


VBG Potassium   (3.6-5.2)  mmol/L


 


Hgb O2 Saturation   (95.0-98.0)  %


 


Sodium   (132-148)  mmol/L


 


Chloride   ()  mmol/L


 


Glucose   ()  mg/dl


 


Lactate   (0.7-2.1)  mmol/L


 


FiO2   %


 


Troponin I   ng/mL


 


Venous Blood Potassium   (3.6-5.2)  mmol/L


 


Urine Color  Yellow  (YELLOW)  


 


Urine Appearance  Sl cloudy  (CLEAR)  


 


Urine pH  6.0  (4.7-8.0)  


 


Ur Specific Gravity  >= 1.030  (1.005-1.035)  


 


Urine Protein  30 H  (<30 mg/dL)  mg/dL


 


Urine Glucose (UA)  Negative  (NEGATIVE)  mg/dL


 


Urine Ketones  15 H  (NEGATIVE)  mg/dL


 


Urine Blood  Trace-intact H  (NEGATIVE)  


 


Urine Nitrate  Negative  (NEGATIVE)  


 


Urine Bilirubin  Small H  (NEGATIVE)  


 


Urine Urobilinogen  0.2  (<1 E.U./dL)  E.U./dL


 


Ur Leukocyte Esterase  Trace H  (NEGATIVE)  Sudha/uL


 


Urine RBC  0 - 2  (0-2)  /hpf


 


Urine WBC  1 - 3  (0-6)  /hpf


 


Ur Epithelial Cells  0 - 2  (0-5)  /hpf


 


Urine Other  Usperm  


 


Urine Opiates Screen   (NEGATIVE)  


 


Urine Methadone Screen   (NEGATIVE)  


 


Ur Barbiturates Screen   (NEGATIVE)  


 


Ur Phencyclidine Scrn   (NEGATIVE)  


 


Ur Amphetamines Screen   (NEGATIVE)  


 


U Benzodiazepines Scrn   (NEGATIVE)  


 


U Oth Cocaine Metabols   (NEGATIVE)  


 


U Cannabinoids Screen   (NEGATIVE)  


 


Influenza Typ A,B (EIA)   (NEGATIVE)  








 Laboratory Results - last 24 hr











  03/07/18 03/07/18 03/07/18





  23:42 23:42 23:45


 


APTT   


 


pCO2   


 


pO2   


 


HCO3   


 


ABG pH   


 


ABG Total CO2   


 


ABG O2 Saturation   


 


ABG O2 Content   


 


ABG Base Excess   


 


ABG Hemoglobin   


 


ABG Carboxyhemoglobin   


 


POC ABG HHb (Measured)   


 


ABG Methemoglobin   


 


ABG O2 Capacity   


 


VBG pH   


 


VBG pCO2   


 


VBG HCO3   


 


VBG Total CO2   


 


VBG O2 Sat (Calc)   


 


VBG Base Excess   


 


VBG Potassium   


 


Hgb O2 Saturation   


 


Sodium   


 


Chloride   


 


Glucose   


 


Lactate   


 


FiO2   


 


Troponin I   


 


Venous Blood Potassium   


 


Urine Color  Yellow  


 


Urine Appearance  Sl cloudy  


 


Urine pH  6.0  


 


Ur Specific Gravity  >= 1.030  


 


Urine Protein  30 H  


 


Urine Glucose (UA)  Negative  


 


Urine Ketones  15 H  


 


Urine Blood  Trace-intact H  


 


Urine Nitrate  Negative  


 


Urine Bilirubin  Small H  


 


Urine Urobilinogen  0.2  


 


Ur Leukocyte Esterase  Trace H  


 


Urine RBC  0 - 2  


 


Urine WBC  1 - 3  


 


Ur Epithelial Cells  0 - 2  


 


Urine Other  Usperm  


 


Urine Opiates Screen    Positive H


 


Urine Methadone Screen    Negative


 


Ur Barbiturates Screen    Negative


 


Ur Phencyclidine Scrn    Negative


 


Ur Amphetamines Screen    Negative


 


U Benzodiazepines Scrn    Negative


 


U Oth Cocaine Metabols    Negative


 


U Cannabinoids Screen    Negative


 


Influenza Typ A,B (EIA)   Negative for flu a/b 














  03/08/18 03/08/18 03/08/18





  00:30 01:10 03:35


 


APTT    73.6 H


 


pCO2   


 


pO2   27 L 


 


HCO3   


 


ABG pH   


 


ABG Total CO2   


 


ABG O2 Saturation   


 


ABG O2 Content   


 


ABG Base Excess   


 


ABG Hemoglobin   


 


ABG Carboxyhemoglobin   


 


POC ABG HHb (Measured)   


 


ABG Methemoglobin   


 


ABG O2 Capacity   


 


VBG pH   7.34 


 


VBG pCO2   52.0 


 


VBG HCO3   28.1 H 


 


VBG Total CO2   29.7 H 


 


VBG O2 Sat (Calc)   55.9 


 


VBG Base Excess   1.4 


 


VBG Potassium   4.2 


 


Hgb O2 Saturation   


 


Sodium   140.0 


 


Chloride   104.0 


 


Glucose   160 H 


 


Lactate   4.2 H* 


 


FiO2   21.0 


 


Troponin I  6.90 H* D  


 


Venous Blood Potassium   4.2 


 


Urine Color   


 


Urine Appearance   


 


Urine pH   


 


Ur Specific Gravity   


 


Urine Protein   


 


Urine Glucose (UA)   


 


Urine Ketones   


 


Urine Blood   


 


Urine Nitrate   


 


Urine Bilirubin   


 


Urine Urobilinogen   


 


Ur Leukocyte Esterase   


 


Urine RBC   


 


Urine WBC   


 


Ur Epithelial Cells   


 


Urine Other   


 


Urine Opiates Screen   


 


Urine Methadone Screen   


 


Ur Barbiturates Screen   


 


Ur Phencyclidine Scrn   


 


Ur Amphetamines Screen   


 


U Benzodiazepines Scrn   


 


U Oth Cocaine Metabols   


 


U Cannabinoids Screen   


 


Influenza Typ A,B (EIA)   














  03/08/18





  05:55


 


APTT 


 


pCO2  32 L


 


pO2  54.0 L


 


HCO3  22.2


 


ABG pH  7.45


 


ABG Total CO2  23.2


 


ABG O2 Saturation  94.2 L


 


ABG O2 Content  14.8 L


 


ABG Base Excess  -1.2


 


ABG Hemoglobin  11.5 L


 


ABG Carboxyhemoglobin  1.8 H


 


POC ABG HHb (Measured)  5.6 H


 


ABG Methemoglobin  1.1


 


ABG O2 Capacity  15.7 L


 


VBG pH 


 


VBG pCO2 


 


VBG HCO3 


 


VBG Total CO2 


 


VBG O2 Sat (Calc) 


 


VBG Base Excess 


 


VBG Potassium 


 


Hgb O2 Saturation  91.4 L


 


Sodium 


 


Chloride 


 


Glucose 


 


Lactate 


 


FiO2  50.0


 


Troponin I 


 


Venous Blood Potassium 


 


Urine Color 


 


Urine Appearance 


 


Urine pH 


 


Ur Specific Gravity 


 


Urine Protein 


 


Urine Glucose (UA) 


 


Urine Ketones 


 


Urine Blood 


 


Urine Nitrate 


 


Urine Bilirubin 


 


Urine Urobilinogen 


 


Ur Leukocyte Esterase 


 


Urine RBC 


 


Urine WBC 


 


Ur Epithelial Cells 


 


Urine Other 


 


Urine Opiates Screen 


 


Urine Methadone Screen 


 


Ur Barbiturates Screen 


 


Ur Phencyclidine Scrn 


 


Ur Amphetamines Screen 


 


U Benzodiazepines Scrn 


 


U Oth Cocaine Metabols 


 


U Cannabinoids Screen 


 


Influenza Typ A,B (EIA) 











Fingerstick Blood Sugar Results: 105





Review of Systems





- Review of Systems


Systems not reviewed;Unavailable: Intubated





Critical Care Progress Note





- Ventilator Checklist


Head of Bed 30 Degrees: Yes


Daily Sedation Vacation: Yes


Daily Assessment of Readiness to Wean: Yes


Daily Spontaneous Breathing Trial: Yes


PUD Prophalyxis: Yes


DVT Prophylaxis: Yes


Oral Care with Chlorhexidine Gluconate {CHG}: Yes





- Vent Settings


MODE:: PRVC





- Extremities/Vascular


Does the Patient have a Central Venous Catheter?: No


Does the Patient need a Central Venous Catheter?: No


Does the Patient have a Sr Catheter?: Yes


Does the Patient need a Sr Catheter?: Yes





- Prophylaxis GI


Prophylaxis GI: PPI





- Prophylaxis DVT


Prophylaxis DVT: Heparin SQ, SCDs





Assessment/Plan





- Assessment and Plan (Free Text)


Assessment: 





57 year old male with questionable medical history of HTN and DM presents with 

hypoxemic respiratory failure secondary to NSTEMI. Admitted to the ICU for vent 

management and close monitoring. Utox was positive for opiates. Transaminitis 

and Leukocytosis is noted. 





Plan:





Neuro: 


- Head CT shows mild atrophy with small vessel disease and indeterminate 

ischemic changes. 


- Sedated but arousable





Cardiology: 


NSTEMI


- Hemodynamically stable


- Maintain MAP >65


- cont ASA, plavix, heparin drip, amiodarone, and nitroglycerin drip per cardio 


- Labetalol prn for elevated BP


- Troponins trending up


- Echocardiogram pending


- Cardiology consulted, evaluating patient for cath


- LDL elevated


- maintain euvolemia 





Pulmonology: 


Hypoxemic respiratory failure


- Intubated and on PRVC 


- Rapid flu negative


- Maintain O2 sat >90


- CXR showed no acute infiltrates or pleural effusions





GI: 


- Transaminits noted


- Protonix


- NPO





ID:


- Afebrile


- Leukocytosis likely secondary to stress reaction from NSTEMI


- Procal ordered 


- Maintain normothermia





Endocrine:


- HgA1c pending


- Glucose levels stable


- Maintain normoglycemia


- TSH wnl





Nephrology:


- Maintain euvolemia 


- Replenish electrolytes as needed








Patient was seen, examined and discussed with attending, Dr. Geovanny Archer PGY1 


Pager # [phone]





<Nathanael Small - Last Filed: 03/08/18 11:05>





CCU Objective





- Vital Signs / Intake & Output


Vital Signs (Last 4 hours): 


Vital Signs











  Temp Pulse Resp BP Pulse Ox


 


 03/08/18 10:35   70   99/60 L 


 


 03/08/18 09:00  99.3 F  67    99


 


 03/08/18 08:54  99.3 F  67   106/61  99


 


 03/08/18 08:50  99.1 F  69    100


 


 03/08/18 08:40  99.1 F  66    100


 


 03/08/18 08:30  99.1 F  65    100


 


 03/08/18 08:24  99.1 F  65   99/59 L  99


 


 03/08/18 08:20  99.1 F  67    100


 


 03/08/18 08:10  99.3 F  67    99


 


 03/08/18 08:00  99.3 F  67    99


 


 03/08/18 07:54  99.3 F  68   96/57 L  98


 


 03/08/18 07:50  99.3 F  68    100


 


 03/08/18 07:47    14   99


 


 03/08/18 07:40  99.5 F  69    100


 


 03/08/18 07:30  99.5 F  70    100


 


 03/08/18 07:24  99.5 F  69   97/59 L  98


 


 03/08/18 07:20  99.5 F  69    99


 


 03/08/18 07:10  99.7 F H  69    99











Intake and Output (Last 8hrs): 


 Intake & Output











 03/07/18 03/08/18 03/08/18





 22:59 06:59 14:59


 


Intake Total  1554 


 


Output Total  325 


 


Balance  1229 


 


Weight  133 lb 11.2 oz 


 


Intake:   


 


  IV  69 


 


  Oral  0 


 


  Tube Feeding  0 


 


  TPN/PPN  0 


 


  Blood Product  0 


 


  Lipid  0 


 


  Albumin  0 


 


  Other  1485 


 


Output:   


 


  Urine  325 


 


    Urethral (Sr)  325 


 


  Stool  0 


 


  Urine/Stool Mix  0 


 


  Emesis  0 


 


  Oral Regurgitation  0 


 


  Other  0 


 


Other:   


 


  Voiding Method  Indwelling Catheter 


 


  # Voids   


 


    Urethral (Sr)  0 


 


  # Bowel Movements  0 














- Medications


Active Medications: 


Active Medications











Generic Name Dose Route Start Last Admin





  Trade Name Freq  PRN Reason Stop Dose Admin


 


Aspirin  81 mg  03/08/18 10:00  03/08/18 09:17





  Aspirin Chewable  PO   81 mg





  DAILY JOYCELYN   Administration


 


Clopidogrel Bisulfate  75 mg  03/08/18 10:00  03/08/18 09:18





  Plavix  PO   75 mg





  DAILY JOYCELYN   Administration


 


Nitroglycerin/Dextrose  50 mg in 250 mls @ 1.5 mls/hr  03/07/18 19:57  03/08/18 

04:30





  Nitroglycerin 50 Mg/250 Ml D5w  IV   0 mcg/min





  .Q24H PRN   0 mls/hr





  Other   Titration





  Protocol   





  5 MCG/MIN   


 


Heparin Sodium/Sodium Chloride  25,000 units in 250 mls @ 8.437 mls/hr  03/07/ 18 20:15  03/07/18 21:30





  Heparin 27291 Units/250ml 1/2 Normal Saline  IV   8.437 mls/hr





  .Q24H JOYCELYN   Administration





  Protocol   





  12 UNITS/KG/HR   


 


Amiodarone HCl/Dextrose  360 mg in 200 mls @ 33.333 mls/hr  03/07/18 20:15  03/ 08/18 08:23





  Nexterone 360 Mg In D5w 200 Ml (Premix)  IV   16.7 mls/hr





  .Q6H JOYCELYN   Administration





  Protocol   





  1 MG/MIN   


 


Sodium Chloride  1,000 mls @ 100 mls/hr  03/07/18 22:15  03/08/18 02:17





  Sodium Chloride 0.9%  IV   100 mls/hr





  .Q10H JOYCELYN   Administration


 


Propofol  1,000 mg in 100 mls @ 1.953 mls/hr  03/07/18 19:46  03/08/18 05:45





  Diprivan  IV   35 mcg/kg/min





  .Q24H PRN   13.669 mls/hr





  TITRATE PER MD ORDER   Titration





  Protocol   





  5 MCG/KG/MIN   


 


Cefepime HCl  1 gm in 100 mls @ 100 mls/hr  03/08/18 09:15  03/08/18 09:43





  Maxipime 1gm  IVPB   100 mls/hr





  Q8 JOYCELYN   Administration





  Protocol   


 


Vancomycin HCl  1 gm in 250 mls @ 167 mls/hr  03/08/18 09:15  03/08/18 10:56





  Vancomycin 1gm  IVPB   167 mls/hr





  Q12H JOYCELYN   Administration





  Protocol   


 


Insulin Human Lispro  0 units  03/08/18 06:00  03/08/18 06:00





  Humalog Low  SC   Not Given





  Q6 JOYCELYN   





  Protocol   


 


Labetalol HCl  5 mg  03/07/18 21:24  





  Trandate  IV   





  Q6H PRN   





  SBP > 160   


 


Metoprolol Tartrate  50 mg  03/08/18 10:00  03/08/18 10:35





  Lopressor  NG   Not Given





  [facility], [facility]   


 


Pantoprazole Sodium  40 mg  03/08/18 10:00  03/08/18 09:29





  Protonix Inj  IVP   40 mg





  DAILY JOYCELYN   Administration














- Patient Studies


Lab Studies: 


 Lab Studies











  03/08/18 03/08/18 03/08/18 Range/Units





  09:30 06:51 06:30 


 


WBC     (4.5-11.0)  10^3/ul


 


RBC     (3.5-6.1)  10^6/uL


 


Hgb     (14.0-18.0)  g/dL


 


Hct     (42.0-52.0)  %


 


MCV     (80.0-105.0)  fl


 


MCH     (25.0-35.0)  pg


 


MCHC     (31.0-37.0)  g/dl


 


RDW     (11.5-14.5)  %


 


Plt Count     (120.0-450.0)  10^3/uL


 


MPV     (7.0-11.0)  fl


 


PT  14.0 H    (9.4-12.5)  SECONDS


 


INR  1.21 H    (0.93-1.08)  


 


APTT  66.9 H    (25.1-36.5)  Seconds


 


pCO2   27 L   (35-45)  mm/Hg


 


pO2   227.0 H   (30-55)  mm/Hg


 


HCO3   22.6   (21-28)  mmol/L


 


ABG pH   7.53 H   (7.35-7.45)  


 


ABG Total CO2   23.4   (22-28)  mmol.L


 


ABG O2 Saturation   99.8 H   (95-98)  %


 


ABG O2 Content   16.6   (15-23)  ML/dl


 


ABG Base Excess   0.8   (-2.0-3.0)  mmol/L


 


ABG Hemoglobin   11.7   (11.7-17.4)  g/dL


 


ABG Carboxyhemoglobin   1.2   (0.5-1.5)  %


 


POC ABG HHb (Measured)   0.2   (0-5)  %


 


ABG Methemoglobin   1.2   (0.0-3.0)  %


 


ABG O2 Capacity   16.6   (16-24)  mL/dl


 


VBG pH     (7.32-7.43)  


 


VBG pCO2     (40-60)  


 


VBG HCO3     (21-28)  mmol/l


 


VBG Total CO2     (22-28)  mmol.L


 


VBG O2 Sat (Calc)     (40-65)  %


 


VBG Base Excess     (0.0-2.0)  mmol/L


 


VBG Potassium     (3.6-5.2)  mmol/L


 


Hgb O2 Saturation   97.4   (95.0-98.0)  %


 


Sodium     (132-148)  mmol/L


 


Chloride     ()  mmol/L


 


Glucose     ()  mg/dl


 


Lactate     (0.7-2.1)  mmol/L


 


FiO2   50.0   %


 


Potassium     (3.6-5.0)  mmol/L


 


Carbon Dioxide     (21-33)  mmol/L


 


Anion Gap     (10-20)  


 


BUN     (7-21)  mg/dL


 


Creatinine     (0.8-1.5)  mg/dl


 


Est GFR (African Amer)     


 


Est GFR (Non-Af Amer)     


 


Random Glucose     ()  mg/dL


 


Calcium     (8.4-10.5)  mg/dL


 


Phosphorus    2.1 L  (2.5-4.5)  mg/dL


 


Magnesium    1.9  (1.7-2.2)  mg/dL


 


Total Bilirubin     (0.2-1.3)  mg/dL


 


AST     (17-59)  U/L


 


ALT     (7-56)  U/L


 


Alkaline Phosphatase     ()  U/L


 


Troponin I     ng/mL


 


Total Protein     (5.8-8.3)  g/dL


 


Albumin     (3.0-4.8)  g/dL


 


Globulin     gm/dL


 


Albumin/Globulin Ratio     (1.1-1.8)  


 


Venous Blood Potassium     (3.6-5.2)  mmol/L


 


Urine Color     (YELLOW)  


 


Urine Appearance     (CLEAR)  


 


Urine pH     (4.7-8.0)  


 


Ur Specific Gravity     (1.005-1.035)  


 


Urine Protein     (<30 mg/dL)  mg/dL


 


Urine Glucose (UA)     (NEGATIVE)  mg/dL


 


Urine Ketones     (NEGATIVE)  mg/dL


 


Urine Blood     (NEGATIVE)  


 


Urine Nitrate     (NEGATIVE)  


 


Urine Bilirubin     (NEGATIVE)  


 


Urine Urobilinogen     (<1 E.U./dL)  E.U./dL


 


Ur Leukocyte Esterase     (NEGATIVE)  Sudha/uL


 


Urine RBC     (0-2)  /hpf


 


Urine WBC     (0-6)  /hpf


 


Ur Epithelial Cells     (0-5)  /hpf


 


Urine Other     


 


Urine Opiates Screen     (NEGATIVE)  


 


Urine Methadone Screen     (NEGATIVE)  


 


Ur Barbiturates Screen     (NEGATIVE)  


 


Ur Phencyclidine Scrn     (NEGATIVE)  


 


Ur Amphetamines Screen     (NEGATIVE)  


 


U Benzodiazepines Scrn     (NEGATIVE)  


 


U Oth Cocaine Metabols     (NEGATIVE)  


 


U Cannabinoids Screen     (NEGATIVE)  


 


Influenza Typ A,B (EIA)     (NEGATIVE)  














  03/08/18 03/08/18 03/08/18 Range/Units





  05:55 05:45 05:30 


 


WBC     (4.5-11.0)  10^3/ul


 


RBC     (3.5-6.1)  10^6/uL


 


Hgb     (14.0-18.0)  g/dL


 


Hct     (42.0-52.0)  %


 


MCV     (80.0-105.0)  fl


 


MCH     (25.0-35.0)  pg


 


MCHC     (31.0-37.0)  g/dl


 


RDW     (11.5-14.5)  %


 


Plt Count     (120.0-450.0)  10^3/uL


 


MPV     (7.0-11.0)  fl


 


PT     (9.4-12.5)  SECONDS


 


INR     (0.93-1.08)  


 


APTT     (25.1-36.5)  Seconds


 


pCO2  32 L    (35-45)  mm/Hg


 


pO2  54.0 L    (30-55)  mm/Hg


 


HCO3  22.2    (21-28)  mmol/L


 


ABG pH  7.45    (7.35-7.45)  


 


ABG Total CO2  23.2    (22-28)  mmol.L


 


ABG O2 Saturation  94.2 L    (95-98)  %


 


ABG O2 Content  14.8 L    (15-23)  ML/dl


 


ABG Base Excess  -1.2    (-2.0-3.0)  mmol/L


 


ABG Hemoglobin  11.5 L    (11.7-17.4)  g/dL


 


ABG Carboxyhemoglobin  1.8 H    (0.5-1.5)  %


 


POC ABG HHb (Measured)  5.6 H    (0-5)  %


 


ABG Methemoglobin  1.1    (0.0-3.0)  %


 


ABG O2 Capacity  15.7 L    (16-24)  mL/dl


 


VBG pH     (7.32-7.43)  


 


VBG pCO2     (40-60)  


 


VBG HCO3     (21-28)  mmol/l


 


VBG Total CO2     (22-28)  mmol.L


 


VBG O2 Sat (Calc)     (40-65)  %


 


VBG Base Excess     (0.0-2.0)  mmol/L


 


VBG Potassium     (3.6-5.2)  mmol/L


 


Hgb O2 Saturation  91.4 L    (95.0-98.0)  %


 


Sodium    142  (132-148)  mmol/L


 


Chloride    106  ()  mmol/L


 


Glucose     ()  mg/dl


 


Lactate     (0.7-2.1)  mmol/L


 


FiO2  50.0    %


 


Potassium    4.2  (3.6-5.0)  mmol/L


 


Carbon Dioxide    23  (21-33)  mmol/L


 


Anion Gap    17  (10-20)  


 


BUN    19  (7-21)  mg/dL


 


Creatinine    0.8  (0.8-1.5)  mg/dl


 


Est GFR (African Amer)    > 60  


 


Est GFR (Non-Af Amer)    > 60  


 


Random Glucose    189 H  ()  mg/dL


 


Calcium    9.8  (8.4-10.5)  mg/dL


 


Phosphorus     (2.5-4.5)  mg/dL


 


Magnesium     (1.7-2.2)  mg/dL


 


Total Bilirubin    0.3  (0.2-1.3)  mg/dL


 


AST    172 H  (17-59)  U/L


 


ALT    131 H  (7-56)  U/L


 


Alkaline Phosphatase    141 H  ()  U/L


 


Troponin I   14.30 H* D   ng/mL


 


Total Protein    7.2  (5.8-8.3)  g/dL


 


Albumin    3.8  (3.0-4.8)  g/dL


 


Globulin    3.4  gm/dL


 


Albumin/Globulin Ratio    1.1  (1.1-1.8)  


 


Venous Blood Potassium     (3.6-5.2)  mmol/L


 


Urine Color     (YELLOW)  


 


Urine Appearance     (CLEAR)  


 


Urine pH     (4.7-8.0)  


 


Ur Specific Gravity     (1.005-1.035)  


 


Urine Protein     (<30 mg/dL)  mg/dL


 


Urine Glucose (UA)     (NEGATIVE)  mg/dL


 


Urine Ketones     (NEGATIVE)  mg/dL


 


Urine Blood     (NEGATIVE)  


 


Urine Nitrate     (NEGATIVE)  


 


Urine Bilirubin     (NEGATIVE)  


 


Urine Urobilinogen     (<1 E.U./dL)  E.U./dL


 


Ur Leukocyte Esterase     (NEGATIVE)  Sudha/uL


 


Urine RBC     (0-2)  /hpf


 


Urine WBC     (0-6)  /hpf


 


Ur Epithelial Cells     (0-5)  /hpf


 


Urine Other     


 


Urine Opiates Screen     (NEGATIVE)  


 


Urine Methadone Screen     (NEGATIVE)  


 


Ur Barbiturates Screen     (NEGATIVE)  


 


Ur Phencyclidine Scrn     (NEGATIVE)  


 


Ur Amphetamines Screen     (NEGATIVE)  


 


U Benzodiazepines Scrn     (NEGATIVE)  


 


U Oth Cocaine Metabols     (NEGATIVE)  


 


U Cannabinoids Screen     (NEGATIVE)  


 


Influenza Typ A,B (EIA)     (NEGATIVE)  














  03/08/18 03/08/18 03/08/18 Range/Units





  05:30 03:35 01:10 


 


WBC  21.2 H D    (4.5-11.0)  10^3/ul


 


RBC  4.03    (3.5-6.1)  10^6/uL


 


Hgb  12.0 L    (14.0-18.0)  g/dL


 


Hct  37.1 L    (42.0-52.0)  %


 


MCV  92.1    (80.0-105.0)  fl


 


MCH  29.8    (25.0-35.0)  pg


 


MCHC  32.3    (31.0-37.0)  g/dl


 


RDW  14.1    (11.5-14.5)  %


 


Plt Count  343    (120.0-450.0)  10^3/uL


 


MPV  10.9    (7.0-11.0)  fl


 


PT     (9.4-12.5)  SECONDS


 


INR     (0.93-1.08)  


 


APTT   73.6 H   (25.1-36.5)  Seconds


 


pCO2     (35-45)  mm/Hg


 


pO2    27 L  (30-55)  mm/Hg


 


HCO3     (21-28)  mmol/L


 


ABG pH     (7.35-7.45)  


 


ABG Total CO2     (22-28)  mmol.L


 


ABG O2 Saturation     (95-98)  %


 


ABG O2 Content     (15-23)  ML/dl


 


ABG Base Excess     (-2.0-3.0)  mmol/L


 


ABG Hemoglobin     (11.7-17.4)  g/dL


 


ABG Carboxyhemoglobin     (0.5-1.5)  %


 


POC ABG HHb (Measured)     (0-5)  %


 


ABG Methemoglobin     (0.0-3.0)  %


 


ABG O2 Capacity     (16-24)  mL/dl


 


VBG pH    7.34  (7.32-7.43)  


 


VBG pCO2    52.0  (40-60)  


 


VBG HCO3    28.1 H  (21-28)  mmol/l


 


VBG Total CO2    29.7 H  (22-28)  mmol.L


 


VBG O2 Sat (Calc)    55.9  (40-65)  %


 


VBG Base Excess    1.4  (0.0-2.0)  mmol/L


 


VBG Potassium    4.2  (3.6-5.2)  mmol/L


 


Hgb O2 Saturation     (95.0-98.0)  %


 


Sodium    140.0  (132-148)  mmol/L


 


Chloride    104.0  ()  mmol/L


 


Glucose    160 H  ()  mg/dl


 


Lactate    4.2 H*  (0.7-2.1)  mmol/L


 


FiO2    21.0  %


 


Potassium     (3.6-5.0)  mmol/L


 


Carbon Dioxide     (21-33)  mmol/L


 


Anion Gap     (10-20)  


 


BUN     (7-21)  mg/dL


 


Creatinine     (0.8-1.5)  mg/dl


 


Est GFR (African Amer)     


 


Est GFR (Non-Af Amer)     


 


Random Glucose     ()  mg/dL


 


Calcium     (8.4-10.5)  mg/dL


 


Phosphorus     (2.5-4.5)  mg/dL


 


Magnesium     (1.7-2.2)  mg/dL


 


Total Bilirubin     (0.2-1.3)  mg/dL


 


AST     (17-59)  U/L


 


ALT     (7-56)  U/L


 


Alkaline Phosphatase     ()  U/L


 


Troponin I     ng/mL


 


Total Protein     (5.8-8.3)  g/dL


 


Albumin     (3.0-4.8)  g/dL


 


Globulin     gm/dL


 


Albumin/Globulin Ratio     (1.1-1.8)  


 


Venous Blood Potassium    4.2  (3.6-5.2)  mmol/L


 


Urine Color     (YELLOW)  


 


Urine Appearance     (CLEAR)  


 


Urine pH     (4.7-8.0)  


 


Ur Specific Gravity     (1.005-1.035)  


 


Urine Protein     (<30 mg/dL)  mg/dL


 


Urine Glucose (UA)     (NEGATIVE)  mg/dL


 


Urine Ketones     (NEGATIVE)  mg/dL


 


Urine Blood     (NEGATIVE)  


 


Urine Nitrate     (NEGATIVE)  


 


Urine Bilirubin     (NEGATIVE)  


 


Urine Urobilinogen     (<1 E.U./dL)  E.U./dL


 


Ur Leukocyte Esterase     (NEGATIVE)  Sudha/uL


 


Urine RBC     (0-2)  /hpf


 


Urine WBC     (0-6)  /hpf


 


Ur Epithelial Cells     (0-5)  /hpf


 


Urine Other     


 


Urine Opiates Screen     (NEGATIVE)  


 


Urine Methadone Screen     (NEGATIVE)  


 


Ur Barbiturates Screen     (NEGATIVE)  


 


Ur Phencyclidine Scrn     (NEGATIVE)  


 


Ur Amphetamines Screen     (NEGATIVE)  


 


U Benzodiazepines Scrn     (NEGATIVE)  


 


U Oth Cocaine Metabols     (NEGATIVE)  


 


U Cannabinoids Screen     (NEGATIVE)  


 


Influenza Typ A,B (EIA)     (NEGATIVE)  














  03/08/18 03/07/18 03/07/18 Range/Units





  00:30 23:45 23:42 


 


WBC     (4.5-11.0)  10^3/ul


 


RBC     (3.5-6.1)  10^6/uL


 


Hgb     (14.0-18.0)  g/dL


 


Hct     (42.0-52.0)  %


 


MCV     (80.0-105.0)  fl


 


MCH     (25.0-35.0)  pg


 


MCHC     (31.0-37.0)  g/dl


 


RDW     (11.5-14.5)  %


 


Plt Count     (120.0-450.0)  10^3/uL


 


MPV     (7.0-11.0)  fl


 


PT     (9.4-12.5)  SECONDS


 


INR     (0.93-1.08)  


 


APTT     (25.1-36.5)  Seconds


 


pCO2     (35-45)  mm/Hg


 


pO2     (30-55)  mm/Hg


 


HCO3     (21-28)  mmol/L


 


ABG pH     (7.35-7.45)  


 


ABG Total CO2     (22-28)  mmol.L


 


ABG O2 Saturation     (95-98)  %


 


ABG O2 Content     (15-23)  ML/dl


 


ABG Base Excess     (-2.0-3.0)  mmol/L


 


ABG Hemoglobin     (11.7-17.4)  g/dL


 


ABG Carboxyhemoglobin     (0.5-1.5)  %


 


POC ABG HHb (Measured)     (0-5)  %


 


ABG Methemoglobin     (0.0-3.0)  %


 


ABG O2 Capacity     (16-24)  mL/dl


 


VBG pH     (7.32-7.43)  


 


VBG pCO2     (40-60)  


 


VBG HCO3     (21-28)  mmol/l


 


VBG Total CO2     (22-28)  mmol.L


 


VBG O2 Sat (Calc)     (40-65)  %


 


VBG Base Excess     (0.0-2.0)  mmol/L


 


VBG Potassium     (3.6-5.2)  mmol/L


 


Hgb O2 Saturation     (95.0-98.0)  %


 


Sodium     (132-148)  mmol/L


 


Chloride     ()  mmol/L


 


Glucose     ()  mg/dl


 


Lactate     (0.7-2.1)  mmol/L


 


FiO2     %


 


Potassium     (3.6-5.0)  mmol/L


 


Carbon Dioxide     (21-33)  mmol/L


 


Anion Gap     (10-20)  


 


BUN     (7-21)  mg/dL


 


Creatinine     (0.8-1.5)  mg/dl


 


Est GFR (African Amer)     


 


Est GFR (Non-Af Amer)     


 


Random Glucose     ()  mg/dL


 


Calcium     (8.4-10.5)  mg/dL


 


Phosphorus     (2.5-4.5)  mg/dL


 


Magnesium     (1.7-2.2)  mg/dL


 


Total Bilirubin     (0.2-1.3)  mg/dL


 


AST     (17-59)  U/L


 


ALT     (7-56)  U/L


 


Alkaline Phosphatase     ()  U/L


 


Troponin I  6.90 H* D    ng/mL


 


Total Protein     (5.8-8.3)  g/dL


 


Albumin     (3.0-4.8)  g/dL


 


Globulin     gm/dL


 


Albumin/Globulin Ratio     (1.1-1.8)  


 


Venous Blood Potassium     (3.6-5.2)  mmol/L


 


Urine Color     (YELLOW)  


 


Urine Appearance     (CLEAR)  


 


Urine pH     (4.7-8.0)  


 


Ur Specific Gravity     (1.005-1.035)  


 


Urine Protein     (<30 mg/dL)  mg/dL


 


Urine Glucose (UA)     (NEGATIVE)  mg/dL


 


Urine Ketones     (NEGATIVE)  mg/dL


 


Urine Blood     (NEGATIVE)  


 


Urine Nitrate     (NEGATIVE)  


 


Urine Bilirubin     (NEGATIVE)  


 


Urine Urobilinogen     (<1 E.U./dL)  E.U./dL


 


Ur Leukocyte Esterase     (NEGATIVE)  Sudha/uL


 


Urine RBC     (0-2)  /hpf


 


Urine WBC     (0-6)  /hpf


 


Ur Epithelial Cells     (0-5)  /hpf


 


Urine Other     


 


Urine Opiates Screen   Positive H   (NEGATIVE)  


 


Urine Methadone Screen   Negative   (NEGATIVE)  


 


Ur Barbiturates Screen   Negative   (NEGATIVE)  


 


Ur Phencyclidine Scrn   Negative   (NEGATIVE)  


 


Ur Amphetamines Screen   Negative   (NEGATIVE)  


 


U Benzodiazepines Scrn   Negative   (NEGATIVE)  


 


U Oth Cocaine Metabols   Negative   (NEGATIVE)  


 


U Cannabinoids Screen   Negative   (NEGATIVE)  


 


Influenza Typ A,B (EIA)    Negative for flu a/b  (NEGATIVE)  














  03/07/18 Range/Units





  23:42 


 


WBC   (4.5-11.0)  10^3/ul


 


RBC   (3.5-6.1)  10^6/uL


 


Hgb   (14.0-18.0)  g/dL


 


Hct   (42.0-52.0)  %


 


MCV   (80.0-105.0)  fl


 


MCH   (25.0-35.0)  pg


 


MCHC   (31.0-37.0)  g/dl


 


RDW   (11.5-14.5)  %


 


Plt Count   (120.0-450.0)  10^3/uL


 


MPV   (7.0-11.0)  fl


 


PT   (9.4-12.5)  SECONDS


 


INR   (0.93-1.08)  


 


APTT   (25.1-36.5)  Seconds


 


pCO2   (35-45)  mm/Hg


 


pO2   (30-55)  mm/Hg


 


HCO3   (21-28)  mmol/L


 


ABG pH   (7.35-7.45)  


 


ABG Total CO2   (22-28)  mmol.L


 


ABG O2 Saturation   (95-98)  %


 


ABG O2 Content   (15-23)  ML/dl


 


ABG Base Excess   (-2.0-3.0)  mmol/L


 


ABG Hemoglobin   (11.7-17.4)  g/dL


 


ABG Carboxyhemoglobin   (0.5-1.5)  %


 


POC ABG HHb (Measured)   (0-5)  %


 


ABG Methemoglobin   (0.0-3.0)  %


 


ABG O2 Capacity   (16-24)  mL/dl


 


VBG pH   (7.32-7.43)  


 


VBG pCO2   (40-60)  


 


VBG HCO3   (21-28)  mmol/l


 


VBG Total CO2   (22-28)  mmol.L


 


VBG O2 Sat (Calc)   (40-65)  %


 


VBG Base Excess   (0.0-2.0)  mmol/L


 


VBG Potassium   (3.6-5.2)  mmol/L


 


Hgb O2 Saturation   (95.0-98.0)  %


 


Sodium   (132-148)  mmol/L


 


Chloride   ()  mmol/L


 


Glucose   ()  mg/dl


 


Lactate   (0.7-2.1)  mmol/L


 


FiO2   %


 


Potassium   (3.6-5.0)  mmol/L


 


Carbon Dioxide   (21-33)  mmol/L


 


Anion Gap   (10-20)  


 


BUN   (7-21)  mg/dL


 


Creatinine   (0.8-1.5)  mg/dl


 


Est GFR (African Amer)   


 


Est GFR (Non-Af Amer)   


 


Random Glucose   ()  mg/dL


 


Calcium   (8.4-10.5)  mg/dL


 


Phosphorus   (2.5-4.5)  mg/dL


 


Magnesium   (1.7-2.2)  mg/dL


 


Total Bilirubin   (0.2-1.3)  mg/dL


 


AST   (17-59)  U/L


 


ALT   (7-56)  U/L


 


Alkaline Phosphatase   ()  U/L


 


Troponin I   ng/mL


 


Total Protein   (5.8-8.3)  g/dL


 


Albumin   (3.0-4.8)  g/dL


 


Globulin   gm/dL


 


Albumin/Globulin Ratio   (1.1-1.8)  


 


Venous Blood Potassium   (3.6-5.2)  mmol/L


 


Urine Color  Yellow  (YELLOW)  


 


Urine Appearance  Sl cloudy  (CLEAR)  


 


Urine pH  6.0  (4.7-8.0)  


 


Ur Specific Gravity  >= 1.030  (1.005-1.035)  


 


Urine Protein  30 H  (<30 mg/dL)  mg/dL


 


Urine Glucose (UA)  Negative  (NEGATIVE)  mg/dL


 


Urine Ketones  15 H  (NEGATIVE)  mg/dL


 


Urine Blood  Trace-intact H  (NEGATIVE)  


 


Urine Nitrate  Negative  (NEGATIVE)  


 


Urine Bilirubin  Small H  (NEGATIVE)  


 


Urine Urobilinogen  0.2  (<1 E.U./dL)  E.U./dL


 


Ur Leukocyte Esterase  Trace H  (NEGATIVE)  Sudha/uL


 


Urine RBC  0 - 2  (0-2)  /hpf


 


Urine WBC  1 - 3  (0-6)  /hpf


 


Ur Epithelial Cells  0 - 2  (0-5)  /hpf


 


Urine Other  Usperm  


 


Urine Opiates Screen   (NEGATIVE)  


 


Urine Methadone Screen   (NEGATIVE)  


 


Ur Barbiturates Screen   (NEGATIVE)  


 


Ur Phencyclidine Scrn   (NEGATIVE)  


 


Ur Amphetamines Screen   (NEGATIVE)  


 


U Benzodiazepines Scrn   (NEGATIVE)  


 


U Oth Cocaine Metabols   (NEGATIVE)  


 


U Cannabinoids Screen   (NEGATIVE)  


 


Influenza Typ A,B (EIA)   (NEGATIVE)  








 Laboratory Results - last 24 hr











  03/07/18 03/07/18 03/07/18





  23:42 23:42 23:45


 


WBC   


 


RBC   


 


Hgb   


 


Hct   


 


MCV   


 


MCH   


 


MCHC   


 


RDW   


 


Plt Count   


 


MPV   


 


PT   


 


INR   


 


APTT   


 


pCO2   


 


pO2   


 


HCO3   


 


ABG pH   


 


ABG Total CO2   


 


ABG O2 Saturation   


 


ABG O2 Content   


 


ABG Base Excess   


 


ABG Hemoglobin   


 


ABG Carboxyhemoglobin   


 


POC ABG HHb (Measured)   


 


ABG Methemoglobin   


 


ABG O2 Capacity   


 


VBG pH   


 


VBG pCO2   


 


VBG HCO3   


 


VBG Total CO2   


 


VBG O2 Sat (Calc)   


 


VBG Base Excess   


 


VBG Potassium   


 


Hgb O2 Saturation   


 


Sodium   


 


Chloride   


 


Glucose   


 


Lactate   


 


FiO2   


 


Potassium   


 


Carbon Dioxide   


 


Anion Gap   


 


BUN   


 


Creatinine   


 


Est GFR ( Amer)   


 


Est GFR (Non-Af Amer)   


 


Random Glucose   


 


Calcium   


 


Phosphorus   


 


Magnesium   


 


Total Bilirubin   


 


AST   


 


ALT   


 


Alkaline Phosphatase   


 


Troponin I   


 


Total Protein   


 


Albumin   


 


Globulin   


 


Albumin/Globulin Ratio   


 


Venous Blood Potassium   


 


Urine Color  Yellow  


 


Urine Appearance  Sl cloudy  


 


Urine pH  6.0  


 


Ur Specific Gravity  >= 1.030  


 


Urine Protein  30 H  


 


Urine Glucose (UA)  Negative  


 


Urine Ketones  15 H  


 


Urine Blood  Trace-intact H  


 


Urine Nitrate  Negative  


 


Urine Bilirubin  Small H  


 


Urine Urobilinogen  0.2  


 


Ur Leukocyte Esterase  Trace H  


 


Urine RBC  0 - 2  


 


Urine WBC  1 - 3  


 


Ur Epithelial Cells  0 - 2  


 


Urine Other  Usperm  


 


Urine Opiates Screen    Positive H


 


Urine Methadone Screen    Negative


 


Ur Barbiturates Screen    Negative


 


Ur Phencyclidine Scrn    Negative


 


Ur Amphetamines Screen    Negative


 


U Benzodiazepines Scrn    Negative


 


U Oth Cocaine Metabols    Negative


 


U Cannabinoids Screen    Negative


 


Influenza Typ A,B (EIA)   Negative for flu a/b 














  03/08/18 03/08/18 03/08/18





  00:30 01:10 03:35


 


WBC   


 


RBC   


 


Hgb   


 


Hct   


 


MCV   


 


MCH   


 


MCHC   


 


RDW   


 


Plt Count   


 


MPV   


 


PT   


 


INR   


 


APTT    73.6 H


 


pCO2   


 


pO2   27 L 


 


HCO3   


 


ABG pH   


 


ABG Total CO2   


 


ABG O2 Saturation   


 


ABG O2 Content   


 


ABG Base Excess   


 


ABG Hemoglobin   


 


ABG Carboxyhemoglobin   


 


POC ABG HHb (Measured)   


 


ABG Methemoglobin   


 


ABG O2 Capacity   


 


VBG pH   7.34 


 


VBG pCO2   52.0 


 


VBG HCO3   28.1 H 


 


VBG Total CO2   29.7 H 


 


VBG O2 Sat (Calc)   55.9 


 


VBG Base Excess   1.4 


 


VBG Potassium   4.2 


 


Hgb O2 Saturation   


 


Sodium   140.0 


 


Chloride   104.0 


 


Glucose   160 H 


 


Lactate   4.2 H* 


 


FiO2   21.0 


 


Potassium   


 


Carbon Dioxide   


 


Anion Gap   


 


BUN   


 


Creatinine   


 


Est GFR ( Amer)   


 


Est GFR (Non-Af Amer)   


 


Random Glucose   


 


Calcium   


 


Phosphorus   


 


Magnesium   


 


Total Bilirubin   


 


AST   


 


ALT   


 


Alkaline Phosphatase   


 


Troponin I  6.90 H* D  


 


Total Protein   


 


Albumin   


 


Globulin   


 


Albumin/Globulin Ratio   


 


Venous Blood Potassium   4.2 


 


Urine Color   


 


Urine Appearance   


 


Urine pH   


 


Ur Specific Gravity   


 


Urine Protein   


 


Urine Glucose (UA)   


 


Urine Ketones   


 


Urine Blood   


 


Urine Nitrate   


 


Urine Bilirubin   


 


Urine Urobilinogen   


 


Ur Leukocyte Esterase   


 


Urine RBC   


 


Urine WBC   


 


Ur Epithelial Cells   


 


Urine Other   


 


Urine Opiates Screen   


 


Urine Methadone Screen   


 


Ur Barbiturates Screen   


 


Ur Phencyclidine Scrn   


 


Ur Amphetamines Screen   


 


U Benzodiazepines Scrn   


 


U Oth Cocaine Metabols   


 


U Cannabinoids Screen   


 


Influenza Typ A,B (EIA)   














  03/08/18 03/08/18 03/08/18





  05:30 05:30 05:45


 


WBC  21.2 H D  


 


RBC  4.03  


 


Hgb  12.0 L  


 


Hct  37.1 L  


 


MCV  92.1  


 


MCH  29.8  


 


MCHC  32.3  


 


RDW  14.1  


 


Plt Count  343  


 


MPV  10.9  


 


PT   


 


INR   


 


APTT   


 


pCO2   


 


pO2   


 


HCO3   


 


ABG pH   


 


ABG Total CO2   


 


ABG O2 Saturation   


 


ABG O2 Content   


 


ABG Base Excess   


 


ABG Hemoglobin   


 


ABG Carboxyhemoglobin   


 


POC ABG HHb (Measured)   


 


ABG Methemoglobin   


 


ABG O2 Capacity   


 


VBG pH   


 


VBG pCO2   


 


VBG HCO3   


 


VBG Total CO2   


 


VBG O2 Sat (Calc)   


 


VBG Base Excess   


 


VBG Potassium   


 


Hgb O2 Saturation   


 


Sodium   142 


 


Chloride   106 


 


Glucose   


 


Lactate   


 


FiO2   


 


Potassium   4.2 


 


Carbon Dioxide   23 


 


Anion Gap   17 


 


BUN   19 


 


Creatinine   0.8 


 


Est GFR ( Amer)   > 60 


 


Est GFR (Non-Af Amer)   > 60 


 


Random Glucose   189 H 


 


Calcium   9.8 


 


Phosphorus   


 


Magnesium   


 


Total Bilirubin   0.3 


 


AST   172 H 


 


ALT   131 H 


 


Alkaline Phosphatase   141 H 


 


Troponin I    14.30 H* D


 


Total Protein   7.2 


 


Albumin   3.8 


 


Globulin   3.4 


 


Albumin/Globulin Ratio   1.1 


 


Venous Blood Potassium   


 


Urine Color   


 


Urine Appearance   


 


Urine pH   


 


Ur Specific Gravity   


 


Urine Protein   


 


Urine Glucose (UA)   


 


Urine Ketones   


 


Urine Blood   


 


Urine Nitrate   


 


Urine Bilirubin   


 


Urine Urobilinogen   


 


Ur Leukocyte Esterase   


 


Urine RBC   


 


Urine WBC   


 


Ur Epithelial Cells   


 


Urine Other   


 


Urine Opiates Screen   


 


Urine Methadone Screen   


 


Ur Barbiturates Screen   


 


Ur Phencyclidine Scrn   


 


Ur Amphetamines Screen   


 


U Benzodiazepines Scrn   


 


U Oth Cocaine Metabols   


 


U Cannabinoids Screen   


 


Influenza Typ A,B (EIA)   














  03/08/18 03/08/18 03/08/18





  05:55 06:30 06:51


 


WBC   


 


RBC   


 


Hgb   


 


Hct   


 


MCV   


 


MCH   


 


MCHC   


 


RDW   


 


Plt Count   


 


MPV   


 


PT   


 


INR   


 


APTT   


 


pCO2  32 L   27 L


 


pO2  54.0 L   227.0 H


 


HCO3  22.2   22.6


 


ABG pH  7.45   7.53 H


 


ABG Total CO2  23.2   23.4


 


ABG O2 Saturation  94.2 L   99.8 H


 


ABG O2 Content  14.8 L   16.6


 


ABG Base Excess  -1.2   0.8


 


ABG Hemoglobin  11.5 L   11.7


 


ABG Carboxyhemoglobin  1.8 H   1.2


 


POC ABG HHb (Measured)  5.6 H   0.2


 


ABG Methemoglobin  1.1   1.2


 


ABG O2 Capacity  15.7 L   16.6


 


VBG pH   


 


VBG pCO2   


 


VBG HCO3   


 


VBG Total CO2   


 


VBG O2 Sat (Calc)   


 


VBG Base Excess   


 


VBG Potassium   


 


Hgb O2 Saturation  91.4 L   97.4


 


Sodium   


 


Chloride   


 


Glucose   


 


Lactate   


 


FiO2  50.0   50.0


 


Potassium   


 


Carbon Dioxide   


 


Anion Gap   


 


BUN   


 


Creatinine   


 


Est GFR ( Amer)   


 


Est GFR (Non-Af Amer)   


 


Random Glucose   


 


Calcium   


 


Phosphorus   2.1 L 


 


Magnesium   1.9 


 


Total Bilirubin   


 


AST   


 


ALT   


 


Alkaline Phosphatase   


 


Troponin I   


 


Total Protein   


 


Albumin   


 


Globulin   


 


Albumin/Globulin Ratio   


 


Venous Blood Potassium   


 


Urine Color   


 


Urine Appearance   


 


Urine pH   


 


Ur Specific Gravity   


 


Urine Protein   


 


Urine Glucose (UA)   


 


Urine Ketones   


 


Urine Blood   


 


Urine Nitrate   


 


Urine Bilirubin   


 


Urine Urobilinogen   


 


Ur Leukocyte Esterase   


 


Urine RBC   


 


Urine WBC   


 


Ur Epithelial Cells   


 


Urine Other   


 


Urine Opiates Screen   


 


Urine Methadone Screen   


 


Ur Barbiturates Screen   


 


Ur Phencyclidine Scrn   


 


Ur Amphetamines Screen   


 


U Benzodiazepines Scrn   


 


U Oth Cocaine Metabols   


 


U Cannabinoids Screen   


 


Influenza Typ A,B (EIA)   














  03/08/18





  09:30


 


WBC 


 


RBC 


 


Hgb 


 


Hct 


 


MCV 


 


MCH 


 


MCHC 


 


RDW 


 


Plt Count 


 


MPV 


 


PT  14.0 H


 


INR  1.21 H


 


APTT  66.9 H


 


pCO2 


 


pO2 


 


HCO3 


 


ABG pH 


 


ABG Total CO2 


 


ABG O2 Saturation 


 


ABG O2 Content 


 


ABG Base Excess 


 


ABG Hemoglobin 


 


ABG Carboxyhemoglobin 


 


POC ABG HHb (Measured) 


 


ABG Methemoglobin 


 


ABG O2 Capacity 


 


VBG pH 


 


VBG pCO2 


 


VBG HCO3 


 


VBG Total CO2 


 


VBG O2 Sat (Calc) 


 


VBG Base Excess 


 


VBG Potassium 


 


Hgb O2 Saturation 


 


Sodium 


 


Chloride 


 


Glucose 


 


Lactate 


 


FiO2 


 


Potassium 


 


Carbon Dioxide 


 


Anion Gap 


 


BUN 


 


Creatinine 


 


Est GFR ( Amer) 


 


Est GFR (Non-Af Amer) 


 


Random Glucose 


 


Calcium 


 


Phosphorus 


 


Magnesium 


 


Total Bilirubin 


 


AST 


 


ALT 


 


Alkaline Phosphatase 


 


Troponin I 


 


Total Protein 


 


Albumin 


 


Globulin 


 


Albumin/Globulin Ratio 


 


Venous Blood Potassium 


 


Urine Color 


 


Urine Appearance 


 


Urine pH 


 


Ur Specific Gravity 


 


Urine Protein 


 


Urine Glucose (UA) 


 


Urine Ketones 


 


Urine Blood 


 


Urine Nitrate 


 


Urine Bilirubin 


 


Urine Urobilinogen 


 


Ur Leukocyte Esterase 


 


Urine RBC 


 


Urine WBC 


 


Ur Epithelial Cells 


 


Urine Other 


 


Urine Opiates Screen 


 


Urine Methadone Screen 


 


Ur Barbiturates Screen 


 


Ur Phencyclidine Scrn 


 


Ur Amphetamines Screen 


 


U Benzodiazepines Scrn 


 


U Oth Cocaine Metabols 


 


U Cannabinoids Screen 


 


Influenza Typ A,B (EIA) 














Assessment/Plan





- Assessment and Plan (Free Text)


Assessment: 


Patient seen and examined on rounds with resident, agree with note with 

following additions/exceptions:


Patient is 56yo male with PMHx of ?DM, HTN, presented after cardiac arrest in 

the field, shocked/DCCV, brought into the ER, went into resp failure, 

subsequently intubated. Pt was not initiaited on hypothermic protocol, as he 

was awake, trying to answer questions, and had sepsis (?source). History 

gathered from signout and documentation. Currently afebrile, HD stable, 

comfortable in NAD, awake, alert. 





NSTEMI


s/p cardiac arrest


Hypoxemic resp failure


Leukocytosis


Sepsis








Recommend:


- cont with ventilatory support, low tidal vol ventilaiton, daily cpap trials, 

ABG, CXR


- Broad spectrum antibiotics, check Procal, UCx, BCx


- IVF hydration


- NPO


- ASA, Plavix, BB


- Hold statin 2/2 elevated LFTs


- ECHO


- Cardiac cath today


- cardiology follow up


- Amio drip


- check lipid panel, HgBA1C


- GI ppx


- DVT ppx


- monitor in MICU





critical care time 35 minutes

## 2018-03-08 NOTE — CARD
--------------- APPROVED REPORT --------------





EKG Measurement

Heart [id number]

MI [id number]

[id number]

[id number]

[id number]



<Conclusion>

Sinus tachycardia

ST & T wave abnormality, consider lateral ischemia

Abnormal ECG

## 2018-03-08 NOTE — RAD
HISTORY:

post intubation  



COMPARISON:

No prior. 



FINDINGS:

The endotracheal tube terminates 4 cm proximal to the sally.  The 

nasogastric tube terminates at the GE junction. 



LUNGS:

The lungs are well inflated.  There is mild pulmonary venous 

congestion.  There is bibasilar atelectasis. No focal consolidation.



PLEURA:

No significant pleural effusion identified, no pneumothorax apparent.



CARDIOVASCULAR:

Normal.



OSSEOUS STRUCTURES:

No significant abnormalities.



VISUALIZED UPPER ABDOMEN:

Normal.



OTHER FINDINGS:

There is severe gaseous distension of the stomach.



IMPRESSION:

Endotracheal tube terminates 4 cm proximal to the sally.  The 

nasogastric tube terminates at the GE junction, further advancement 

is recommended.



Pulmonary venous congestion. No focal consolidation.

## 2018-03-09 NOTE — CP.PCM.PN
Subjective





- Date & Time of Evaluation


Date of Evaluation: 03/08/18


Time of Evaluation: 13:20





- Subjective


Subjective: 





At 13:18 code heart was called. Patient had an NSTEMI during cath procedure. 





Ventilator Settings: Tidal Volume 500, Respiratory Rate 14, PEEP 5, 40% FiO2. 

PRVC mode. 


Vitals: HR 65 /60/87 RR 36 O2 % saturation 100











Objective





- Vital Signs/Intake and Output


Vital Signs (last 24 hours): 


 











Temp Pulse Resp BP Pulse Ox


 


 100.2 F H  66   15   176/59 H  100 


 


 03/08/18 20:56  03/08/18 21:00  03/08/18 20:56  03/08/18 16:26  03/08/18 20:56








Intake and Output: 


 











 03/09/18 03/09/18





 06:59 18:59


 


Intake Total 350 


 


Balance 350 














- Labs


Labs: 


 





 03/08/18 05:30 





 03/08/18 05:30 





 











PT  14.0 SECONDS (9.4-12.5)  H  03/08/18  09:30    


 


INR  1.21  (0.93-1.08)  H  03/08/18  09:30    


 


APTT  66.9 Seconds (25.1-36.5)  H  03/08/18  09:30

## 2018-03-09 NOTE — CARD
--------------- APPROVED REPORT --------------





EKG Measurement

Heart [id number]

LA [id number]

[id number]

[id number]

[id number]



<Conclusion>

Normal sinus rhythm

Septal infarct, age undetermined

Abnormal ECG

## 2018-05-22 ENCOUNTER — HOSPITAL ENCOUNTER (INPATIENT)
Dept: HOSPITAL 42 - ED | Age: 58
LOS: 3 days | Discharge: HOME | DRG: 121 | End: 2018-05-25
Attending: HOSPITALIST | Admitting: HOSPITALIST
Payer: COMMERCIAL

## 2018-05-22 VITALS — BODY MASS INDEX: 21.4 KG/M2

## 2018-05-22 DIAGNOSIS — I25.10: ICD-10-CM

## 2018-05-22 DIAGNOSIS — I50.9: ICD-10-CM

## 2018-05-22 DIAGNOSIS — E83.51: ICD-10-CM

## 2018-05-22 DIAGNOSIS — I11.0: ICD-10-CM

## 2018-05-22 DIAGNOSIS — I27.20: ICD-10-CM

## 2018-05-22 DIAGNOSIS — Z79.82: ICD-10-CM

## 2018-05-22 DIAGNOSIS — I25.5: ICD-10-CM

## 2018-05-22 DIAGNOSIS — Z86.79: ICD-10-CM

## 2018-05-22 DIAGNOSIS — Z79.84: ICD-10-CM

## 2018-05-22 DIAGNOSIS — G47.33: ICD-10-CM

## 2018-05-22 DIAGNOSIS — Z95.1: ICD-10-CM

## 2018-05-22 DIAGNOSIS — F17.210: ICD-10-CM

## 2018-05-22 DIAGNOSIS — F11.10: ICD-10-CM

## 2018-05-22 DIAGNOSIS — E11.9: ICD-10-CM

## 2018-05-22 DIAGNOSIS — E83.42: ICD-10-CM

## 2018-05-22 DIAGNOSIS — I21.4: Primary | ICD-10-CM

## 2018-05-22 DIAGNOSIS — Z86.74: ICD-10-CM

## 2018-05-22 DIAGNOSIS — E87.6: ICD-10-CM

## 2018-05-22 LAB
ALBUMIN SERPL-MCNC: 4.4 G/DL (ref 3–4.8)
ALBUMIN/GLOB SERPL: 1.2 {RATIO} (ref 1.1–1.8)
ALT SERPL-CCNC: 50 U/L (ref 7–56)
APPEARANCE UR: CLEAR
APTT BLD: 30.1 SECONDS (ref 25.1–36.5)
AST SERPL-CCNC: 57 U/L (ref 17–59)
BASOPHILS # BLD AUTO: 0.03 K/MM3 (ref 0–2)
BASOPHILS NFR BLD: 0.2 % (ref 0–3)
BILIRUB UR-MCNC: NEGATIVE MG/DL
BNP SERPL-MCNC: 478 PG/ML (ref 0–450)
BUN SERPL-MCNC: 15 MG/DL (ref 7–21)
CALCIUM SERPL-MCNC: 9.2 MG/DL (ref 8.4–10.5)
COLOR UR: YELLOW
EOSINOPHIL # BLD: 0.1 10*3/UL (ref 0–0.7)
EOSINOPHIL NFR BLD: 0.9 % (ref 1.5–5)
EPI CELLS #/AREA URNS HPF: (no result) /HPF (ref 0–5)
ERYTHROCYTE [DISTWIDTH] IN BLOOD BY AUTOMATED COUNT: 15.3 % (ref 11.5–14.5)
GFR NON-AFRICAN AMERICAN: > 60
GLUCOSE UR STRIP-MCNC: NEGATIVE MG/DL
GRANULOCYTES # BLD: 7.22 10*3/UL (ref 1.4–6.5)
GRANULOCYTES NFR BLD: 56.7 % (ref 50–68)
HGB BLD-MCNC: 12.4 G/DL (ref 14–18)
INR PPP: 1.08 (ref 0.93–1.08)
LEUKOCYTE ESTERASE UR-ACNC: NEGATIVE LEU/UL
LYMPHOCYTES # BLD: 4.8 10*3/UL (ref 1.2–3.4)
LYMPHOCYTES NFR BLD AUTO: 37.7 % (ref 22–35)
MCH RBC QN AUTO: 29.7 PG (ref 25–35)
MCHC RBC AUTO-ENTMCNC: 33.5 G/DL (ref 31–37)
MCV RBC AUTO: 88.7 FL (ref 80–105)
MONOCYTES # BLD AUTO: 0.6 10*3/UL (ref 0.1–0.6)
MONOCYTES NFR BLD: 4.5 % (ref 1–6)
PH UR STRIP: 7 [PH] (ref 4.7–8)
PLATELET # BLD: 394 10^3/UL (ref 120–450)
PMV BLD AUTO: 9.6 FL (ref 7–11)
PROT UR STRIP-MCNC: (no result) MG/DL
PROTHROMBIN TIME: 12.3 SECONDS (ref 9.4–12.5)
RBC # BLD AUTO: 4.17 10^6/UL (ref 3.5–6.1)
RBC # UR STRIP: NEGATIVE /UL
RBC #/AREA URNS HPF: (no result) /HPF (ref 0–2)
SP GR UR STRIP: 1.02 (ref 1–1.03)
TROPONIN I SERPL-MCNC: < 0.01 NG/ML
URINE HYALINE CAST: (no result) /HPF
UROBILINOGEN UR STRIP-ACNC: 0.2 E.U./DL
WBC # BLD AUTO: 12.8 10^3/UL (ref 4.5–11)
WBC #/AREA URNS HPF: (no result) /HPF (ref 0–6)

## 2018-05-22 NOTE — CP.PCM.HP
<William Smith - Last Filed: 05/22/18 23:28>





History of Present Illness





- History of Present Illness


History of Present Illness: 


58 year old  male with a past medical history of  CABG (with a 

life vest), DM II, heroin abuse (sniffs), tobacco abuse who [place] [facility] [place]

  [place]ing chest discomfort, associated dyspnea, and racing sensation in his 

heart after walking one block after he got off the bus. He reports being in his 

normal health, eating some spicy chicken for dinner and then  catching the bus 

and getting off the [place] on [address]. He reports walking after walking one 

block and then feeling some burning, chest discomfort. He denies experiencing 

any nausea, diaphoresis, radiation of the pain, or worsening of symptoms 

associated with exertion. His chest pain, was not pressure like. He reports it 

did respond to topical NG given to him in the ED. He reports taking all of his 

medications. He is still smoking cigarretes (3 a day) and still doing heroin 

once or twice a week. Last did heroin 2 days ago.  He reports following up with 

his PMD and cardiologist, but cannot recall which medications he is on. He 

denies fever, chills, palpitations, nausea, vomiting, syncope, light 

headedness. He denies any precipitating factors. 





PMH: DM II, CABG, Cardiac arrest, active smoking and heroin abuse


PSH: CABG, ruptured appendix


Allergies: NKA


Medications: Unaware, pharmacy is [facility]


PMD: Dr. Kasia Davenport


Cardiologist: Dr. ERYN Andrews ([facility])








Present on Admission





- Present on Admission


Any Indicators Present on Admission: No





Review of Systems





- Review of Systems


All systems: reviewed and no additional remarkable complaints except (as per HPI

)





Past Patient History





- Infectious Disease


Hx of Infectious Diseases: None





- Past Social History


Smoking Status: Heavy Smoker > 10 Cigarettes Daily





- CARDIAC


Hx Cardiac Disorders: Yes


Hx Cardia Arrhythmia: Yes


Hx Hypertension: Yes


Other/Comment: CARDIA ARREST





- PULMONARY


Hx Respiratory Disorders: Yes


Other/Comment: SMOKER





- NEUROLOGICAL


Hx Neurological Disorder: Yes





- HEENT


Hx HEENT Problems: No





- RENAL


Hx Chronic Kidney Disease: No





- ENDOCRINE/METABOLIC


Hx Endocrine Disorders: Yes


Hx Diabetes Mellitus Type 2: Yes





- HEMATOLOGICAL/ONCOLOGICAL


Hx Blood Disorders: Yes


Hx Hepatitis B: Yes





- INTEGUMENTARY


Hx Dermatological Problems: No





- MUSCULOSKELETAL/RHEUMATOLOGICAL


Hx Musculoskeletal Disorders: No





- GASTROINTESTINAL


Hx Gastrointestinal Disorders: Yes


Other/Comment: APPENDICITIS





- GENITOURINARY/GYNECOLOGICAL


Hx Genitourinary Disorders: No





- PSYCHIATRIC


Hx Psychophysiologic Disorder: No


Hx Substance Use: Yes (heroin)





- SURGICAL HISTORY


Hx Appendectomy: Yes


Other/Comment: CARDIAC SURGERY





Meds


Allergies/Adverse Reactions: 


 Allergies











Allergy/AdvReac Type Severity Reaction Status Date / Time


 


No Known Allergies Allergy   Verified 05/22/18 20:55














Physical Exam





- Constitutional


Appears: Well, Non-toxic





- Head Exam


Head Exam: ATRAUMATIC, NORMOCEPHALIC





- Eye Exam


Eye Exam: EOMI, Normal appearance





- ENT Exam


ENT Exam: Mucous Membranes Moist, Normal Oropharynx





- Neck Exam


Neck exam: Positive for: Normal Inspection





- Respiratory Exam


Respiratory Exam: Clear to Auscultation Bilateral, NORMAL BREATHING PATTERN.  

absent: Accessory Muscle Use





- Cardiovascular Exam


Cardiovascular Exam: RRR, +S1, +S2





- GI/Abdominal Exam


GI & Abdominal Exam: Normal Bowel Sounds, Soft





- Extremities Exam


Extremities exam: Positive for: normal inspection.  Negative for: calf 

tenderness





- Back Exam


Back exam: NORMAL INSPECTION.  absent: CVA tenderness (L), CVA tenderness (R)





- Neurological Exam


Neurological exam: Alert, CN II-XII Intact, Oriented x3





- Psychiatric Exam


Psychiatric exam: Normal Affect, Normal Mood





- Skin


Skin Exam: Dry, Intact, Normal Color, Warm





Results





- Vital Signs


Recent Vital Signs: 





 Last Vital Signs











Temp  98.9 F   05/22/18 21:03


 


Pulse  72   05/22/18 21:50


 


Resp  18   05/22/18 21:50


 


BP  115/56 L  05/22/18 21:50


 


Pulse Ox  100   05/22/18 21:50














- Labs


Result Diagrams: 


 05/22/18 21:23





 05/22/18 21:23





Assessment & Plan





- Assessment and Plan (Free Text)


Assessment: 


58 year old male with a past medical history significant for CABG, DM II, 

active smoking and heroin use who presents with burning chest pain not 

associated with exertion.





Plan: 


1) Chest discomfort, not typical angina


- troponins x1 (-), repeat x 2


- initial EKG showed Non-specific ST-T wave changes


- Cardiology consulted, Dr. Macedo


- Serial EKG


- TSH, HbgA1c, lipid panel


- Cardiology consulted


- Aspirin daily


- Toprol 50 mg Daily


- restart home medications by contacting the pharmacy or the patient should be 

getting a list from his brother sometime tonight





2) DM II


- ISS low (regular insulin)


- FSBG ACHS





3) DVT/GI Prophylaxis


- Lovenox 40 mg SC


- GI Pepcid PO daily





Case reviewed and discussed with Dr. Swan











- Date & Time


Date: 05/22/18


Time: 23:40





<Hilary Swan - Last Filed: 05/23/18 02:51>





Results





- Vital Signs


Recent Vital Signs: 





 Last Vital Signs











Temp  98.4 F   05/23/18 02:02


 


Pulse  72   05/23/18 02:02


 


Resp  20   05/23/18 02:02


 


BP  130/63   05/23/18 02:02


 


Pulse Ox  99   05/23/18 00:28














- Labs


Result Diagrams: 


 05/22/18 21:23





 05/22/18 21:23


Labs: 





 Laboratory Results - last 24 hr











  05/22/18 05/22/18 05/23/18





  22:58 22:58 00:14


 


POC Glucose (mg/dL)    113 H


 


Troponin I   


 


Urine Color  Yellow  


 


Urine Appearance  Clear  


 


Urine pH  7.0  


 


Ur Specific Gravity  1.020  


 


Urine Protein  Trace H  


 


Urine Glucose (UA)  Negative  


 


Urine Ketones  Negative  


 


Urine Blood  Negative  


 


Urine Nitrate  Negative  


 


Urine Bilirubin  Negative  


 


Urine Urobilinogen  0.2  


 


Ur Leukocyte Esterase  Negative  


 


Urine RBC  0 - 2  


 


Urine WBC  0 - 2  


 


Ur Epithelial Cells  0 - 2  


 


Hyaline Casts  0 - 2  


 


Urine Other  Usperm  


 


Urine Opiates Screen   Positive H 


 


Urine Methadone Screen   Negative 


 


Ur Barbiturates Screen   Negative 


 


Ur Phencyclidine Scrn   Negative 


 


Ur Amphetamines Screen   Negative 


 


U Benzodiazepines Scrn   Negative 


 


U Oth Cocaine Metabols   Negative 


 


U Cannabinoids Screen   Negative 














  05/23/18





  00:58


 


POC Glucose (mg/dL) 


 


Troponin I  0.16 H* D


 


Urine Color 


 


Urine Appearance 


 


Urine pH 


 


Ur Specific Gravity 


 


Urine Protein 


 


Urine Glucose (UA) 


 


Urine Ketones 


 


Urine Blood 


 


Urine Nitrate 


 


Urine Bilirubin 


 


Urine Urobilinogen 


 


Ur Leukocyte Esterase 


 


Urine RBC 


 


Urine WBC 


 


Ur Epithelial Cells 


 


Hyaline Casts 


 


Urine Other 


 


Urine Opiates Screen 


 


Urine Methadone Screen 


 


Ur Barbiturates Screen 


 


Ur Phencyclidine Scrn 


 


Ur Amphetamines Screen 


 


U Benzodiazepines Scrn 


 


U Oth Cocaine Metabols 


 


U Cannabinoids Screen 














Attending/Attestation





- Attestation


I have personally seen and examined this patient.: Yes


I have fully participated in the care of the patient.: Yes


I have reviewed all pertinent clinical information: Yes


Notes (Text): 





05/23/18 02:38


Pt stated he was operated "Ulcers" and ruptured appendix.


O/E he has a sternal scar and a Life vest fitted on his chest by his 

cardiologist.


Agree with rest of documentation and orders placed.


05/23/18 02:44

## 2018-05-22 NOTE — ED PDOC
Arrival/HPI





- General


Chief Complaint: Chest Pain


Time Seen by Provider: 05/22/18 21:03


Historian: Patient





- History of Present Illness


Narrative History of Present Illness (Text): 


05/22/18 21:15





A 58 year old male whose past medical history includes HTN and DM, presents to 

the emergency department for a complaint of severe substernal chest tightness 

half an hour prior to Emergency department arrival tonight. The patient states 

that he was walking to the store about 30 minutes ago when he began to feel the 

chest discomfort. He notes that it was non-radiating and ranked the pain at 9 

out of 10. He also complains of acute shortness of breath, severe diaphoresis, 

lightheadedness, and generalized malaise/weakness. He presents to the emergency 

department for further evaluation of his symptoms. Patient states that his last 

use of heroin was 2 days ago. Patient snorts heroin. He notes that he has a 

cardiac evaluation with his private cardiologist earlier today. The patient 

denies fevers, chills, headache, LOC, numbness, tingling, palpitations, cough, 

abdominal pain, nausea, vomiting, diarrhea, back pain, neck pain, urinary/bowel 

changes, or any other complaint.


pt is here for further eval.








PMD: Dr. Kasia Davenport


Cardiologist: Dr. ERYN Andrews ([facility])











Time/Duration: Prior to Arrival


Symptom Onset: Sudden


Symptom Course: Unchanged


Quality: Pressure, Tightness


Severity Level: 9, Severe


Activities at Onset: Rest, Light


Context: Walking





Past Medical History





- Provider Review


Nursing Documentation Reviewed: Yes





- Travel History


Have you recently traveled outside [place] w/in the past 3 mons?: No





- Past History


Past History: No Previous (recent hx of Cardiac arrest/NSTEMI (triple vessel 

bypass + 2 stents))





- Infectious Disease


Hx of Infectious Diseases: None





- Cardiac


Hx Cardiac Disorders: Yes


Hx Cardiac Arrhythmia: Yes


Hx Hypertension: Yes


Other/Comment: CARDIA ARREST





- Pulmonary


Hx Respiratory Disorders: Yes


Other/Comment: SMOKER





- Neurological


Hx Neurological Disorder: Yes





- HEENT


Hx HEENT Disorder: No





- Renal


Hx Renal Disorder: No





- Endocrine/Metabolic


Hx Endocrine Disorders: Yes


Hx Diabetes Mellitus Type 2: Yes





- Hematological/Oncological


Hx Blood Disorders: Yes


Hx Hepatitis B: Yes





- Integumentary


Hx Dermatological Disorder: No





- Musculoskeletal/Rheumatological


Hx Musculoskeletal Disorders: No





- Gastrointestinal


Hx Gastrointestinal Disorders: Yes


Other/Comment: APPENDICITIS





- Genitourinary/Gynecological


Hx Genitourinary Disorders: No





- Psychiatric


Hx Psychophysiologic Disorder: No


Hx Substance Use: No





- Surgical History


Hx Appendectomy: Yes


Other/Comment: CARDIAC SURGERY





Family/Social History





- Physician Review


Nursing Documentation Reviewed: Yes


Family/Social History: No Known Family HX


Smoking Status: Heavy Smoker > 10 Cigarettes Daily


Hx Alcohol Use: No


Hx Substance Use: Yes (heroin)


Hx Substance Use Treatment: No





Allergies/Home Meds


Allergies/Adverse Reactions: 


Allergies





No Known Allergies Allergy (Verified 05/22/18 20:55)


 








Home Medications: 


 Home Meds











 Medication  Instructions  Recorded  Confirmed


 


Unobtainable  03/08/18 05/22/18














Review of Systems





- Physician Review


All systems were reviewed & negative as marked: Yes





- Review of Systems


Constitutional: absent: Fevers, Night Sweats


Eyes: Normal


ENT: Normal


Respiratory: SOB


Cardiovascular: Chest Pain


Gastrointestinal: absent: Abdominal Pain, Stool Changes, Diarrhea, Nausea, 

Vomiting


Genitourinary Male: Normal.  absent: Urinary Output Changes


Musculoskeletal: absent: Back Pain, Neck Pain


Skin: Normal


Neurological: Dizziness, Other (Lightheaded).  absent: Headache


Endocrine: Diaphoresis


Hemo/Lymphatic: Normal


Psychiatric: Normal





Physical Exam





- Physical Exam


Narrative Physical Exam (Text): 


05/22/18 21:19





General: alert/awake, GCS = 15, oriented x 3, resting in bed, uncomfortable, 

cooperative, NAD, follows command with ease


Head: NC/AT


EYE: PERRLA, EOMI, sclera anicteric, no nystagmus, no photophobia; visual field 

intact b/l


Facial: WNL


Oral: uvula/tongue are midline, no exudate/lesions, no drooling/stridor, no 

dysphonia, poor dentitions


NECK: intact ROM, no midline tenderness, no nuchal rigidity, no meningeal signs


Chest: CTA b/l, no w/r/r; no tachypenia, no accessory muscle use noted; Mid-

sternal well healed surgical scar is noted (due to triple vessel bypass surgery)

; pt is wearing a cardiac vest device (defib)


Cardiac: +S1, +S2, no m/r/r


Abdominal: +BS, soft/nd/nt, well nourished patient; no masses/rebound/guarding/

rigidity; no reynolds's sign, no mcburney's point tenderness


ext: intact ROM, strength 5/5 grossly intact in all limbs, neurovasc intact b/l

; no rach's sign b/l, no pitting edema noted b/l


SKIN: cap refill ~ 1 sec, no ulcerations, no petechiae, no rashes; no pallor 

noted


NEURO: CNII-XII WNL, no facial asymmetries, no slurr speech, oriented x 3 


NIH stroke scale ~ 0


Psych: normal insight, normal affect








Vital Signs Reviewed: Yes


Vital Signs











  Temp Pulse Resp BP Pulse Ox


 


 05/22/18 21:50   72  18  115/56 L  100


 


 05/22/18 21:03  98.9 F  90  20  136/92 H  98











Temperature: Afebrile


Blood Pressure: Hypertensive


Pulse: Regular


Respiratory Rate: Normal


Appearance: Positive for: Well-Appearing, Non-Toxic, Uncomfortable


Pain Distress: None


Mental Status: Positive for: Alert and Oriented X 3





- Systems Exam


Head: Present: Atraumatic, Normocephalic





Medical Decision Making


ED Course and Treatment: 


05/22/18 21:20





Impression:


A 58 year old male presents to the emergency department with a complaint of 

chest tightness, diaphoresis, generalized malaise, and shortness of breath.





Plan:


-- EKG


-- Chest X-ray 


-- Labs


-- Aspirin, Nitroglycerin, and IV Fluids


-- Urinalysis 


-- Reassess and disposition








Progress Notes:











Re-evaluation Time: 22:30


Reassessment Condition: Improved





- Critical Care


Critical Care Minutes: 30 minutes


Critical Care Time: Excluding Proc Time


Narrative Critical Care (Text): 





05/22/18 22:53


critical care time: 30min, excluding procedure time, excluding time teaching 

residents/students/mid-level providers; including initial eval/diagnosis, 

diagnostic interpretation, re-eval, consultations, final disposition





- Lab Interpretations


Lab Results: 








 05/22/18 21:23 





 05/22/18 21:23 





 Lab Results





05/22/18 21:23: Sodium 144, Potassium 3.6, Chloride 102, Carbon Dioxide 28, 

Anion Gap 18, BUN 15, Creatinine 0.8, Est GFR (African Amer) > 60, Est GFR (Non-

Af Amer) > 60, Random Glucose 168 H, Calcium 9.2, Magnesium 1.9, Total 

Bilirubin 0.3, AST 57, ALT 50, Alkaline Phosphatase 137 H, Lactate 

Dehydrogenase 437, Total Creatine Kinase 151, Troponin I < 0.01  D, NT-Pro-B 

Natriuret Pep 478 H, Total Protein 7.9, Albumin 4.4, Globulin 3.5, Albumin/

Globulin Ratio 1.2


05/22/18 21:23: PT 12.3, INR 1.08, APTT 30.1


05/22/18 21:23: WBC 12.8 H D, RBC 4.17, Hgb 12.4 L, Hct 37.0 L, MCV 88.7  D, 

MCH 29.7, MCHC 33.5, RDW 15.3 H, Plt Count 394, MPV 9.6, Gran % 56.7, Lymph % (

Auto) 37.7 H, Mono % (Auto) 4.5, Eos % (Auto) 0.9 L, Baso % (Auto) 0.2, Gran # 

7.22 H, Lymph # (Auto) 4.8 H, Mono # (Auto) 0.6, Eos # (Auto) 0.1, Baso # (Auto

) 0.03








I have reviewed the lab results: Yes


Interpretation: Abnormal lab values (elevated BNP/gluc)





- RAD Interpretation


Narrative RAD Interpretations (Text): 





05/22/18 21:22: Chest X-ray read and interpreted by me shows sternotomy wires, 

hyperinflated lings, no pleural effusions, and no free air. 








Radiology Orders: 








05/22/18 21:11


CHEST PORTABLE [RAD] Stat 











: ED Physician





- EKG Interpretation


EKG Interpretation (Text): 





05/22/18 21:31: NSR at 95 BPM, normal axis, no ectopy, inverted T waves noted 

at v1- v3, byphasic T waves v4-v6, mild ST depression noted II, III, F; Q waves 

noted v1-v2; abnormal EKG; grossly changed compared to old EKG 03/2018.








Interpreted by ED Physician: Yes


Type: 12 lead EKG


Comparison: Different from prev. EKG





- Medication Orders


Current Medication Orders: 








Sodium Chloride (Sodium Chloride 0.9%)  1,000 mls @ 100 mls/hr IV .Q10H JOYCELYN


   Last Admin: 05/22/18 21:19  Dose: 100 mls/hr





eMAR Start Stop


 Document     05/22/18 21:19  LA  (Rec: 05/22/18 21:20  LA  [id number])


     Intravenous Solution


      Start Date                                 05/22/18


      Start Time                                 21:20








Discontinued Medications





Aspirin (Aspirin Chewable)  81 mg PO STAT STA


   Stop: 05/22/18 21:11


   Last Admin: 05/22/18 21:19  Dose: 81 mg





Nitroglycerin (Nitro-Bid 2% Oint)  1 ea TOP STAT STA


   Stop: 05/22/18 21:11


   Last Admin: 05/22/18 21:19  Dose: 1 ea











- Scribe Statement


The provider has reviewed the documentation as recorded by the Scribe


Velma Haro 





Provider Johnathanibe Attestation:


All medical record entries made by the Scribe were at my direction and 

personally dictated by me. I have reviewed the chart and agree that the record 

accurately reflects my personal performance of the history, physical exam, 

medical decision making, and the department course for this patient. I have 

also personally directed, reviewed, and agree with the discharge instructions 

and disposition.








Disposition/Present on Arrival





- Present on Arrival


Any Indicators Present on Arrival: No


History of DVT/PE: No


History of Uncontrolled Diabetes: Yes


Urinary Catheter: No


History of Decub. Ulcer: No


History Surgical Site Infection Following: None





- Disposition


Have Diagnosis and Disposition been Completed?: Yes


Diagnosis: 


 Chest pain with moderate risk of acute coronary syndrome, Drug abuse





Disposition: HOSPITALIZED


Disposition Time: 22:30


Patient Plan: Admission, Observation


Condition: STABLE


Discharge Instructions (ExitCare):  Chest Pain (ED)


Print Language: ENGLISH

## 2018-05-23 LAB
ALBUMIN SERPL-MCNC: 3.5 G/DL (ref 3–4.8)
ALBUMIN/GLOB SERPL: 1.1 {RATIO} (ref 1.1–1.8)
ALT SERPL-CCNC: 38 U/L (ref 7–56)
APTT BLD: 55.9 SECONDS (ref 25.1–36.5)
AST SERPL-CCNC: 38 U/L (ref 17–59)
BASOPHILS # BLD AUTO: 0.04 K/MM3 (ref 0–2)
BASOPHILS NFR BLD: 0.4 % (ref 0–3)
BUN SERPL-MCNC: 13 MG/DL (ref 7–21)
CALCIUM SERPL-MCNC: 7.9 MG/DL (ref 8.4–10.5)
EOSINOPHIL # BLD: 0.2 10*3/UL (ref 0–0.7)
EOSINOPHIL NFR BLD: 1.6 % (ref 1.5–5)
ERYTHROCYTE [DISTWIDTH] IN BLOOD BY AUTOMATED COUNT: 15.4 % (ref 11.5–14.5)
GFR NON-AFRICAN AMERICAN: > 60
GRANULOCYTES # BLD: 5.8 10*3/UL (ref 1.4–6.5)
GRANULOCYTES NFR BLD: 53.8 % (ref 50–68)
HDLC SERPL-MCNC: 32 MG/DL (ref 29–60)
HGB BLD-MCNC: 10.7 G/DL (ref 14–18)
INR PPP: 1.15 (ref 0.93–1.08)
LDLC SERPL-MCNC: 60 MG/DL (ref 0–129)
LYMPHOCYTES # BLD: 3.9 10*3/UL (ref 1.2–3.4)
LYMPHOCYTES NFR BLD AUTO: 35.7 % (ref 22–35)
MCH RBC QN AUTO: 29.1 PG (ref 25–35)
MCHC RBC AUTO-ENTMCNC: 32.9 G/DL (ref 31–37)
MCV RBC AUTO: 88.3 FL (ref 80–105)
MONOCYTES # BLD AUTO: 0.9 10*3/UL (ref 0.1–0.6)
MONOCYTES NFR BLD: 8.5 % (ref 1–6)
PLATELET # BLD: 351 10^3/UL (ref 120–450)
PMV BLD AUTO: 10 FL (ref 7–11)
PROTHROMBIN TIME: 13.2 SECONDS (ref 9.4–12.5)
RBC # BLD AUTO: 3.68 10^6/UL (ref 3.5–6.1)
TROPONIN I SERPL-MCNC: 0.14 NG/ML
WBC # BLD AUTO: 10.8 10^3/UL (ref 4.5–11)

## 2018-05-23 RX ADMIN — INSULIN HUMAN SCH: 100 INJECTION, SOLUTION PARENTERAL at 22:10

## 2018-05-23 RX ADMIN — NITROGLYCERIN SCH EA: 20 OINTMENT TOPICAL at 14:05

## 2018-05-23 RX ADMIN — NITROGLYCERIN SCH EA: 20 OINTMENT TOPICAL at 08:44

## 2018-05-23 RX ADMIN — HEPARIN SODIUM SCH MLS/HR: 10000 INJECTION, SOLUTION INTRAVENOUS at 08:29

## 2018-05-23 RX ADMIN — INSULIN HUMAN SCH: 100 INJECTION, SOLUTION PARENTERAL at 11:06

## 2018-05-23 RX ADMIN — INSULIN HUMAN SCH: 100 INJECTION, SOLUTION PARENTERAL at 17:11

## 2018-05-23 RX ADMIN — NITROGLYCERIN SCH EA: 20 OINTMENT TOPICAL at 02:42

## 2018-05-23 RX ADMIN — INSULIN HUMAN SCH: 100 INJECTION, SOLUTION PARENTERAL at 08:17

## 2018-05-23 RX ADMIN — NITROGLYCERIN SCH EA: 20 OINTMENT TOPICAL at 22:10

## 2018-05-23 RX ADMIN — Medication SCH MG: at 10:58

## 2018-05-23 NOTE — CARD
--------------- APPROVED REPORT --------------





EKG Measurement

Heart [id number]

VA [id number]

[id number]

[id number]

[id number]



<Conclusion>

Normal sinus rhythm

Poor R Progression V1-V3.

ST_T Changes. Correlate Clinically.

## 2018-05-23 NOTE — CON
DATE:  05/23/2018



CARDIOLOGY CONSULTATION



REASON FOR CONSULTATION:  Chest tightness.



HISTORY OF PRESENT ILLNESS:  Patient is a 58-year-old  male

who is an active heroine abuser by snorting it.  He was admitted on 03/07

with the diagnosis of cardiac arrest.  Cardiac catheterization in the next

day revealed critical left main stenosis with an ostial LAD and circumflex

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

and a nondominant right coronary artery.  Patient was transferred to [facility] and underwent coronary artery bypass surgery and was

discharged with a LifeVest; however, the patient never saw any physician

after his discharge and states that the vest battery has been depleted. 

The patient claims to has been taking his medications.  He presents because

of shortness of breath and chest tightness.



SOCIAL HISTORY:  Patient is a smoker and  _____ snorter, the last snorting

was one day prior to admission.



MEDICATIONS:  Aspirin 81 mg once a day, intravenous heparin infusion in

therapeutic regimen for acute coronary artery syndrome, hydrochlorothiazide

25 mg once a day, Lipitor 80 mg once a day, Lopressor 25 mg twice a day,

Zestril 20 mg once a day.



PHYSICAL EXAMINATION:

GENERAL:  The patient is a middle-aged male, who does not appear to be in

acute distress.

VITAL SIGNS:  Blood pressure 118/88, heart rate 88, temperature 97.9,

respirations 18.

HEENT:  Normocephalic.

CHEST:  Clear.

HEART:  S1, S2 regular.

ABDOMEN:  Soft.

EXTREMITIES:  No edema.



DIAGNOSTIC DATA:  Chest x-ray revealed normal cardiac silhouette, mild CHF.

EKG revealed sinus rhythm with anterior T-wave inversion, consider anterior

ischemia.



LABORATORY DATA:  Today's SMA-7 is within normal limits except for

creatinine 0.7.  Troponin is 0.16 and 0.14.  Hemoglobin and hematocrit 10.7

and 32.5.  White count and platelet count are within normal limits.  Urine

drug screen is positive for opiates.



ASSESSMENT:

1.  Consider ischemic cardiomyopathy.

2.  Rule out pulmonary embolism.

3.  Consider non-ST elevation myocardial infarction.

4.  Active heroine abuser.



RECOMMENDATIONS:  Continue current aspirin 81 mg once a day, intravenous

heparin therapeutic regimen, hydrochlorothiazide 25 mg once a day, Lipitor

80 mg once a day, Lopressor 25 mg once a day, Zestril 20 mg once a day. 

Obtain CT angio of the chest to rule out pulmonary embolism and start

Plavix 75 mg once a day.  Obtain an echocardiogram.  Conservative medical

approach is recommended in view of the patient's extreme noncompliance.





__________________________________________

Bakari Macedo MD



DD:  05/23/2018 10:53:27

DT:  05/23/2018 20:46:50

Job # [id number]

## 2018-05-23 NOTE — CT
PROCEDURE:  CT Chest with contrast (Pulmonary Angiogram)



HISTORY:

R/O PE



COMPARISON:

None available. 



TECHNIQUE:

Axial computed tomography images were obtained of the chest in the 

pulmonary arterial phase of enhancement. Coronal and sagittal 

reformatted images were created and reviewed.



Maximum intensity projection (MIP) reconstructed images in the 

following planes: Coronal and sagittal. 



Intravenous contrast dose: 100 cc Omnipaque 350



Mean Hounsfield unit values in the main pulmonary artery: 537.06. 



Radiation dose:



Total exam DLP = 314.19 mGy-cm.



This CT exam was performed using one or more of the following dose 

reduction techniques: Automated exposure control, adjustment of the 

mA and/or kV according to patient size, and/or use of iterative 

reconstruction technique.



FINDINGS:



PULMONARY ARTERIES:

Unremarkable. No pulmonary embolism. 



AORTA:

No acute findings. No thoracic aortic aneurysm. 



LUNGS:

Unremarkable. No nodule, mass or pulmonary consolidation. 



PLEURAL SPACES:

Unremarkable. No effusion or pneuomothorax. 



HEART:

Unremarkable. No cardiomegaly. No significant pericardial effusion. 



LYMPH NODES:

No lymphadenopathy.



BONES, CHEST WALL:

Unremarkable. No fracture or destructive lesion 



OTHER FINDINGS:

Unremarkable. 



IMPRESSION:

Unremarkable CT pulmonary angiogram. No pulmonary embolus.

## 2018-05-23 NOTE — RAD
HISTORY:

Chest pain, shortness of breath.



COMPARISON:

03/08/2018. 



FINDINGS:



LUNGS:

No active pulmonary disease.



PLEURA:

No significant pleural effusion identified, no pneumothorax apparent.



CARDIOVASCULAR:

 No radiographic findings to suggest acute or significant 

cardiovascular disease. Postoperative findings related to median 

sternotomy. 



OSSEOUS STRUCTURES:

No significant abnormalities.



VISUALIZED UPPER ABDOMEN:

Normal.



OTHER FINDINGS:

Removal of support apparatus since the prior study: Nasogastric tube.



IMPRESSION:

No active disease. No significant interval change compared to the 

prior examination(s).

## 2018-05-23 NOTE — CARD
--------------- APPROVED REPORT --------------





EXAM: Two-dimensional and M-mode echocardiogram with Doppler and 

color Doppler.



INDICATION

Congestive Heart Failure 



2D DIMENSIONS 

Left Atrium (2D)4.0   (1.6-4.0cm)IVSd0.9   (0.7-1.1cm)

LVDd3.8   (3.9-5.9cm)PWd1.1   (0.7-1.1cm)

LVDs2.5   (2.5-4.0cm)FS (%) 34.3   %

LVEF (%)64.1   (>50%)



M-Mode DIMENSIONS 

Aortic Root3.40   (2.2-3.7cm)Aortic Cusp Exc.1.60   (1.5-2.0cm)



Aortic Valve

AoV Peak Ghejdkfg827.0cm/Quinn Peak GR.9mmHg



Mitral Valve

MV E Wpgymjgl430.0cm/sMV A Wsarlzfa07.1cm/sE/A ratio1.3



TDI

Lateral E' Peak V15.20cm/sMedial E' Peak V5.56cm/sE/Lateral E'6.7

E/Medial E'18.3



Pulmonary Valve

PV Peak Tazdppvp17.5cm/sPV Peak Grad.2mmHg



Tricuspid Valve

TR Peak Mgistfle773tx/sRAP HZTEMPFN52xwYsBF Peak Gr.23mmHg

[id number]



 LEFT VENTRICLE 

The left ventricle is normal size. There is normal left ventricular 

wall thickness. The left ventricular ejection fraction is within the 

normal range. Septal motion consistent with post-operative state. 

Transmitral Doppler flow pattern is abnormal.



 RIGHT VENTRICLE 

The right ventricle is normal size. There is normal right ventricular 

wall thickness. The right ventricular systolic function is normal.



 ATRIA 

The left atrium is borderline dilated. The right atrium size is 

normal.



 AORTIC VALVE 

The aortic valve is moderately thickened. No aortic regurgitation is 

present. There is no aortic valvular stenosis.



 MITRAL VALVE 

The mitral valve is mildly thickened. Mitral regurgitation is mild.



 TRICUSPID VALVE 

The tricuspid valve is normal in structure. There is mild pulmonary 

hypertension. There is mild tricuspid regurgitation.



 GREAT VESSELS 

The aortic root is normal in size.



 PERICARDIAL EFFUSION 

There is no pericardial effusion.



<Conclusion>

The left ventricle is normal size.

There is normal left ventricular wall thickness.

The left ventricular ejection fraction is within the normal range.

Septal motion consistent with post-operative state.

Mitral regurgitation is mild.

There is mild pulmonary hypertension.

## 2018-05-23 NOTE — CP.PCM.PN
<Leonard Saravia - Last Filed: 05/23/18 13:15>





Subjective





- Date & Time of Evaluation


Date of Evaluation: 05/23/18


Time of Evaluation: 07:00





- Subjective


Subjective: 





Medicine Note for Dr. Toth





Patient seen and examined at bedside. No acute event overnight. Patient states 

chest pain and SOB has improved. Patient is tolerating diet. He has no other 

complaints at this time. He is tolerating diet. He will be going for ECHO and 

CT chest today.





Objective





- Vital Signs/Intake and Output


Vital Signs (last 24 hours): 


 











Temp Pulse Resp BP Pulse Ox


 


 97.9 F   88   18   118/88   99 


 


 05/23/18 06:22  05/23/18 06:22  05/23/18 06:22  05/23/18 06:22  05/23/18 00:28








Intake and Output: 


 











 05/23/18 05/23/18





 06:59 18:59


 


Intake Total 0 


 


Output Total 210 


 


Balance -210 














- Medications


Medications: 


 Current Medications





Aspirin (Aspirin Chewable)  81 mg PO DAILY [facility]


Atorvastatin Calcium (Lipitor)  80 mg PO DIN [facility]


Famotidine (Pepcid)  40 mg PO HS [facility]


Hydrochlorothiazide (Hydrodiuril)  25 mg PO DAILY [facility]


Sodium Chloride (Sodium Chloride 0.9%)  1,000 mls @ 100 mls/hr IV .Q10H [facility]


   Last Admin: 05/22/18 21:19 Dose:  100 mls/hr


Heparin Sodium/Sodium Chloride (Heparin 40254 Units/250ml 1/2 Normal Saline)  25

,000 units in 250 mls @ 7.239 mls/hr IV .Q24H [facility]; 12 UNITS/KG/HR


   PRN Reason: Protocol


   Last Admin: 05/23/18 08:29 Dose:  12 units/kg/hr, 7.239 mls/hr


Insulin Human Regular (Humulin R Low)  0 units SC ACHS [facility]


   PRN Reason: Protocol


   Last Admin: 05/23/18 08:17 Dose:  Not Given


Lisinopril (Zestril)  20 mg PO DAILY [facility]


Magnesium Oxide (Mag-Ox)  400 mg PO DAILY [facility]


Metoprolol Tartrate (Lopressor)  25 mg PO BID [facility]


Nitroglycerin (Nitro-Bid 2% Oint)  0.5 ea TOP Q6H [facility]


   Last Admin: 05/23/18 02:42 Dose:  0.5 ea











- Labs


Labs: 


 





 05/23/18 06:30 





 05/23/18 06:30 





 











PT  12.3 SECONDS (9.4-12.5)   05/22/18  21:23    


 


INR  1.08  (0.93-1.08)   05/22/18  21:23    


 


APTT  30.1 Seconds (25.1-36.5)   05/22/18  21:23    














- Constitutional


Appears: No Acute Distress





- Head Exam


Head Exam: ATRAUMATIC, NORMOCEPHALIC





- Eye Exam


Eye Exam: EOMI, Normal appearance


Pupil Exam: PERRL





- ENT Exam


ENT Exam: Mucous Membranes Moist





- Respiratory Exam


Respiratory Exam: Clear to Ausculation Bilateral, NORMAL BREATHING PATTERN





- Cardiovascular Exam


Cardiovascular Exam: REGULAR RHYTHM, +S1, +S2





- GI/Abdominal Exam


GI & Abdominal Exam: Soft, Normal Bowel Sounds.  absent: Tenderness





- Extremities Exam


Extremities Exam: Normal Capillary Refill.  absent: Calf Tenderness





- Back Exam


Back Exam: absent: CVA tenderness (L), CVA tenderness (R)





- Neurological Exam


Neurological Exam: Alert, Awake, Oriented x3





- Psychiatric Exam


Psychiatric exam: Normal Affect, Normal Mood





- Skin


Skin Exam: Dry, Intact, Normal Color, Warm





Assessment and Plan





- Assessment and Plan (Free Text)


Plan: 





58 year old male with a past medical history significant for CABG, DM, active 

smoking and heroin use who presents with chest pain and SOB





1) NSTEMI


- troponins 0.16 --> 0.14


- initial EKG showed Non-specific ST-T wave changes


- Cardiology consulted, Dr. Macedo


- TSH, HbgA1c, lipid panel


- Cardiology consulted


- Heparin drip


- Aspirin daily


- plavix daily


- Toprol 50 mg Daily


- Nitro


- 


- ECHO


- CT chest: unreamrkable, no evidence of PE





2) DM 


- ISS low (regular insulin)


- FSBG ACHS


- HHD mod carb consistent





3) CHF


Lipitor


Metoprolol


Hctz


Lisinopril


Strict I's & O's





4.) HTN


Metoprolol


Hctz


Lisinopril





DVT/GI Prophylaxis


- Lovenox 40 mg SC


- GI Pepcid PO daily





Case reviewed and discussed with Dr. Janey Saravia PGY1


[phone]





<Terrance Toth - Last Filed: 05/23/18 15:05>





Objective





- Vital Signs/Intake and Output


Vital Signs (last 24 hours): 


 











Temp Pulse Resp BP Pulse Ox


 


 98.3 F   72   20   125/70   99 


 


 05/23/18 11:36  05/23/18 11:36  05/23/18 11:36  05/23/18 11:36  05/23/18 00:28








Intake and Output: 


 











 05/23/18 05/23/18





 06:59 18:59


 


Intake Total 0 


 


Output Total 210 


 


Balance -210 














- Medications


Medications: 


 Current Medications





Aspirin (Aspirin Chewable)  81 mg PO DAILY [facility]


   Last Admin: 05/23/18 10:57 Dose:  81 mg


Atorvastatin Calcium (Lipitor)  80 mg PO DIN [facility]


Clopidogrel Bisulfate (Plavix)  75 mg PO DAILY [facility]


   Last Admin: 05/23/18 11:04 Dose:  75 mg


Famotidine (Pepcid)  40 mg PO HS [facility]


Hydrochlorothiazide (Hydrodiuril)  25 mg PO DAILY [facility]


   Last Admin: 05/23/18 10:58 Dose:  25 mg


Sodium Chloride (Sodium Chloride 0.9%)  1,000 mls @ 100 mls/hr IV .Q10H [facility]


   Last Admin: 05/22/18 21:19 Dose:  100 mls/hr


Heparin Sodium/Sodium Chloride (Heparin 36545 Units/250ml 1/2 Normal Saline)  25

,000 units in 250 mls @ 7.239 mls/hr IV .Q24H [facility]; 12 UNITS/KG/HR


   PRN Reason: Protocol


   Last Admin: 05/23/18 08:29 Dose:  12 units/kg/hr, 7.239 mls/hr


Insulin Human Regular (Humulin R Low)  0 units SC ACHS [facility]


   PRN Reason: Protocol


   Last Admin: 05/23/18 11:06 Dose:  Not Given


Lisinopril (Zestril)  20 mg PO DAILY [facility]


   Last Admin: 05/23/18 10:57 Dose:  20 mg


Magnesium Oxide (Mag-Ox)  400 mg PO DAILY [facility]


   Last Admin: 05/23/18 10:58 Dose:  400 mg


Metoprolol Tartrate (Lopressor)  25 mg PO BID [facility]


   Last Admin: 05/23/18 10:58 Dose:  25 mg


Nitroglycerin (Nitro-Bid 2% Oint)  0.5 ea TOP Q6H [facility]


   Last Admin: 05/23/18 14:05 Dose:  0.5 ea











- Labs


Labs: 


 





 05/23/18 06:30 





 05/23/18 06:30 





 











PT  12.3 SECONDS (9.4-12.5)   05/22/18  21:23    


 


INR  1.08  (0.93-1.08)   05/22/18  21:23    


 


APTT  30.1 Seconds (25.1-36.5)   05/22/18  21:23    














Attending/Attestation





- Attestation


I have personally seen and examined this patient.: Yes


I have fully participated in the care of the patient.: Yes


I have reviewed all pertinent clinical information, including history, physical 

exam and plan: Yes


Notes (Text): 





Patient seen and examined. Agree with above


Admitted for chest pain, NSTEMI. Recent CABG at [facility] this year


peak troponin of 0.16, non-specific ST-T changes


Cardio consulted, started on heparin gtt


continue with asa/plavix/bblocker/statin


pending echo to assess LV function and WMA


CT chest unremarkable for PE


Counseled on illicit drug use cessation


Plan of care made aware to the patient

## 2018-05-23 NOTE — CARD
--------------- APPROVED REPORT --------------





EKG Measurement

Heart [id number]

DE 168P77

[id number]

[id number]

[id number]



<Conclusion>

Normal sinus rhythm

Normal ECG

## 2018-05-24 LAB
ALBUMIN SERPL-MCNC: 2.7 G/DL (ref 3–4.8)
ALBUMIN SERPL-MCNC: 4.3 G/DL (ref 3–4.8)
ALBUMIN/GLOB SERPL: 1 {RATIO} (ref 1.1–1.8)
ALBUMIN/GLOB SERPL: 1.2 {RATIO} (ref 1.1–1.8)
ALT SERPL-CCNC: 40 U/L (ref 7–56)
ALT SERPL-CCNC: 44 U/L (ref 7–56)
AST SERPL-CCNC: 29 U/L (ref 17–59)
AST SERPL-CCNC: 39 U/L (ref 17–59)
BASOPHILS # BLD AUTO: 0.03 K/MM3 (ref 0–2)
BASOPHILS NFR BLD: 0.3 % (ref 0–3)
BUN SERPL-MCNC: 7 MG/DL (ref 7–21)
BUN SERPL-MCNC: 9 MG/DL (ref 7–21)
CALCIUM SERPL-MCNC: 6.6 MG/DL (ref 8.4–10.5)
CALCIUM SERPL-MCNC: 9.8 MG/DL (ref 8.4–10.5)
EOSINOPHIL # BLD: 0.1 10*3/UL (ref 0–0.7)
EOSINOPHIL NFR BLD: 0.9 % (ref 1.5–5)
ERYTHROCYTE [DISTWIDTH] IN BLOOD BY AUTOMATED COUNT: 14.9 % (ref 11.5–14.5)
GFR NON-AFRICAN AMERICAN: > 60
GFR NON-AFRICAN AMERICAN: > 60
GRANULOCYTES # BLD: 6.61 10*3/UL (ref 1.4–6.5)
GRANULOCYTES NFR BLD: 56.8 % (ref 50–68)
HGB BLD-MCNC: 11.2 G/DL (ref 14–18)
LYMPHOCYTES # BLD: 4.1 10*3/UL (ref 1.2–3.4)
LYMPHOCYTES NFR BLD AUTO: 35.5 % (ref 22–35)
MCH RBC QN AUTO: 28.7 PG (ref 25–35)
MCHC RBC AUTO-ENTMCNC: 32.9 G/DL (ref 31–37)
MCV RBC AUTO: 87.2 FL (ref 80–105)
MONOCYTES # BLD AUTO: 0.8 10*3/UL (ref 0.1–0.6)
MONOCYTES NFR BLD: 6.5 % (ref 1–6)
PLATELET # BLD: 340 10^3/UL (ref 120–450)
PMV BLD AUTO: 9.8 FL (ref 7–11)
RBC # BLD AUTO: 3.9 10^6/UL (ref 3.5–6.1)
TROPONIN I SERPL-MCNC: 0.02 NG/ML
WBC # BLD AUTO: 11.6 10^3/UL (ref 4.5–11)

## 2018-05-24 RX ADMIN — POTASSIUM CHLORIDE SCH MEQ: 1.5 SOLUTION ORAL at 09:57

## 2018-05-24 RX ADMIN — POTASSIUM CHLORIDE SCH MEQ: 1.5 SOLUTION ORAL at 11:25

## 2018-05-24 RX ADMIN — INSULIN HUMAN SCH: 100 INJECTION, SOLUTION PARENTERAL at 11:50

## 2018-05-24 RX ADMIN — ENOXAPARIN SODIUM SCH MG: 40 INJECTION SUBCUTANEOUS at 11:23

## 2018-05-24 RX ADMIN — POTASSIUM CHLORIDE SCH MEQ: 1.5 SOLUTION ORAL at 07:54

## 2018-05-24 RX ADMIN — INSULIN HUMAN SCH: 100 INJECTION, SOLUTION PARENTERAL at 17:04

## 2018-05-24 RX ADMIN — INSULIN HUMAN SCH: 100 INJECTION, SOLUTION PARENTERAL at 22:13

## 2018-05-24 RX ADMIN — NITROGLYCERIN SCH EA: 20 OINTMENT TOPICAL at 02:18

## 2018-05-24 RX ADMIN — INSULIN HUMAN SCH: 100 INJECTION, SOLUTION PARENTERAL at 07:40

## 2018-05-24 RX ADMIN — HEPARIN SODIUM SCH MLS/HR: 10000 INJECTION, SOLUTION INTRAVENOUS at 09:58

## 2018-05-24 RX ADMIN — Medication SCH MG: at 11:23

## 2018-05-24 NOTE — CP.PCM.PN
<ManjitLeonard - Last Filed: 05/24/18 12:35>





Subjective





- Date & Time of Evaluation


Date of Evaluation: 05/24/18


Time of Evaluation: 07:25





- Subjective


Subjective: 





Medicine Note for Dr. Zelaya





Patient seen and examined at bedside. Patient had episode of heart block/

bradycardia overnight. Nitro and metoprolol are held. Patient also had 

electrolyte disturbance so HCTZ was held.  Patient denies chest pain and SOB 

has improved. He states that he was aymptomatic all night and slept fine. 

Patient is tolerating diet. He has no complaints at this time.





Objective





- Vital Signs/Intake and Output


Vital Signs (last 24 hours): 


 











Temp Pulse Resp BP Pulse Ox


 


 97.4 F L  52 L  18   121/65   99 


 


 05/24/18 06:00  05/24/18 06:00  05/24/18 06:00  05/24/18 06:00  05/24/18 06:00








Intake and Output: 


 











 05/24/18 05/24/18





 06:59 18:59


 


Intake Total 1826 250


 


Output Total 2150 


 


Balance -324 250














- Medications


Medications: 


 Current Medications





Aspirin (Aspirin Chewable)  81 mg PO DAILY [facility]


   Last Admin: 05/24/18 09:58 Dose:  81 mg


Atorvastatin Calcium (Lipitor)  80 mg PO DIN [facility]


Clopidogrel Bisulfate (Plavix)  75 mg PO DAILY [facility]


   Last Admin: 05/24/18 09:57 Dose:  75 mg


Dextrose (Dextrose 50% Inj)  50 ml IVP Q1H [facility]


   PRN Reason: Hypoglycemia


Enoxaparin Sodium (Lovenox)  40 mg SC DAILY [facility]


   PRN Reason: Protocol


   Last Admin: 05/24/18 11:23 Dose:  40 mg


Famotidine (Pepcid)  40 mg PO HS [facility]


   Last Admin: 05/23/18 22:10 Dose:  40 mg


Sodium Chloride (Sodium Chloride 0.9%)  1,000 mls @ 100 mls/hr IV .Q10H [facility]


   Last Admin: 05/24/18 02:17 Dose:  100 mls/hr


Insulin Human Regular (Humulin R Low)  0 units SC ACHS [facility]


   PRN Reason: Protocol


   Last Admin: 05/24/18 07:40 Dose:  Not Given


Lisinopril (Zestril)  20 mg PO DAILY [facility]


   Last Admin: 05/24/18 09:58 Dose:  20 mg


Magnesium Oxide (Mag-Ox)  400 mg PO DAILY [facility]


   Last Admin: 05/24/18 11:23 Dose:  400 mg


Metoprolol Tartrate (Lopressor)  25 mg PO BID [facility]


   Last Admin: 05/23/18 17:06 Dose:  25 mg


Nitroglycerin (Nitro-Bid 2% Oint)  0.5 ea TOP Q6H [facility]


   Last Admin: 05/24/18 02:18 Dose:  0.5 ea











- Labs


Labs: 


 





 05/24/18 06:20 





 05/24/18 06:20 





 











PT  13.2 SECONDS (9.4-12.5)  H  05/23/18  15:55    


 


INR  1.15  (0.93-1.08)  H  05/23/18  15:55    


 


APTT  58.7 Seconds (25.1-36.5)  H  05/24/18  06:20    














- Additional Findings


Additional findings: 


- Constitutional


Appears: No Acute Distress





- Head Exam


Head Exam: ATRAUMATIC, NORMOCEPHALIC





- Eye Exam


Eye Exam: EOMI, Normal appearance


Pupil Exam: PERRL





- ENT Exam


ENT Exam: Mucous Membranes Moist





- Respiratory Exam


Respiratory Exam: Clear to Ausculation Bilateral, NORMAL BREATHING PATTERN





- Cardiovascular Exam


Cardiovascular Exam: REGULAR RHYTHM, +S1, +S2





- GI/Abdominal Exam


GI & Abdominal Exam: Soft, Normal Bowel Sounds.  absent: Tenderness





- Extremities Exam


Extremities Exam: Normal Capillary Refill.  absent: Calf Tenderness





- Back Exam


Back Exam: absent: CVA tenderness (L), CVA tenderness (R)





- Neurological Exam


Neurological Exam: Alert, Awake, Oriented x3





- Psychiatric Exam


Psychiatric exam: Normal Affect, Normal Mood





- Skin


Skin Exam: Dry, Intact, Normal Color, Warm





Assessment and Plan





- Assessment and Plan (Free Text)


Assessment: 


58 year old male with a past medical history significant for CABG, DM, active 

smoking and heroin use who presents with chest pain and SOB





1) NSTEMI


- troponins 0.16 --> 0.14


- initial EKG showed Non-specific ST-T wave changes


- Cardiology consulted, Dr. Macedo


- Patient wearing life vest


- EP Cardiology consulted, Dr. Lee


- Heparin drip discontinued


- Lovenox


- Aspirin daily


- plavix daily


- Nitro


- 


- f/u ECHO


- CT chest: unreamrkable, no evidence of PE





2) Electrolyte disturbances


- Ca 7.2


- Mag 1.4


- K 3.0


- Mag sulfate 2 gm IVPB


- Calcium gluconate IVPB


- Potassium Chloride 20 mEq PO x 3 and 10 mEq IVPB x 2


- Repeat labs at 12 pm


- Replete PRN





3) DM 


- ISS low (regular insulin)


- Accuchecks ACHS


- HHD mod carb consistent





4) CHF


- Lipitor


- HOLD Metoprolol due to heart block/bradycardia overnight


- Lisinopril


- Strict I's & O's





5) HTN


- HOLD Metoprolol due to heart block/bradycardia overnight


- Lisinopril





DVT/GI Prophylaxis


- Lovenox 40 mg SC


- GI Pepcid PO daily





Case reviewed and discussed with Dr. Garcia Saravia PGY1


[phone]





<Nasir Zelaya - Last Filed: 05/24/18 14:57>





Objective





- Vital Signs/Intake and Output


Vital Signs (last 24 hours): 


 











Temp Pulse Resp BP Pulse Ox


 


 97.4 F L  52 L  18   121/65   99 


 


 05/24/18 06:00  05/24/18 06:00  05/24/18 06:00  05/24/18 06:00  05/24/18 06:00








Intake and Output: 


 











 05/24/18 05/24/18





 06:59 18:59


 


Intake Total 1826 730


 


Output Total 2150 500


 


Balance -324 230














- Medications


Medications: 


 Current Medications





Aspirin (Aspirin Chewable)  81 mg PO DAILY [facility]


   Last Admin: 05/24/18 09:58 Dose:  81 mg


Atorvastatin Calcium (Lipitor)  80 mg PO DIN [facility]


Clopidogrel Bisulfate (Plavix)  75 mg PO DAILY [facility]


   Last Admin: 05/24/18 09:57 Dose:  75 mg


Dextrose (Dextrose 50% Inj)  50 ml IVP Q1H [facility]


   PRN Reason: Hypoglycemia


Enoxaparin Sodium (Lovenox)  40 mg SC DAILY [facility]


   PRN Reason: Protocol


   Last Admin: 05/24/18 11:23 Dose:  40 mg


Famotidine (Pepcid)  40 mg PO HS [facility]


   Last Admin: 05/23/18 22:10 Dose:  40 mg


Sodium Chloride (Sodium Chloride 0.9%)  1,000 mls @ 100 mls/hr IV .Q10H [facility]


   Last Admin: 05/24/18 02:17 Dose:  100 mls/hr


Insulin Human Regular (Humulin R Low)  0 units SC ACHS [facility]


   PRN Reason: Protocol


   Last Admin: 05/24/18 11:50 Dose:  Not Given


Lisinopril (Zestril)  20 mg PO DAILY [facility]


   Last Admin: 05/24/18 09:58 Dose:  20 mg


Magnesium Oxide (Mag-Ox)  400 mg PO DAILY [facility]


   Last Admin: 05/24/18 11:23 Dose:  400 mg


Metoprolol Tartrate (Lopressor)  25 mg PO BID [facility]


   Last Admin: 05/23/18 17:06 Dose:  25 mg


Nitroglycerin (Nitro-Bid 2% Oint)  0.5 ea TOP Q6H [facility]


   Last Admin: 05/24/18 02:18 Dose:  0.5 ea











- Labs


Labs: 


 





 05/24/18 06:20 





 05/24/18 12:10 





 











PT  13.2 SECONDS (9.4-12.5)  H  05/23/18  15:55    


 


INR  1.15  (0.93-1.08)  H  05/23/18  15:55    


 


APTT  58.7 Seconds (25.1-36.5)  H  05/24/18  06:20    














Attending/Attestation





- Attestation


I have personally seen and examined this patient.: Yes


I have fully participated in the care of the patient.: Yes


I have reviewed all pertinent clinical information, including history, physical 

exam and plan: Yes


Notes (Text): 





I have seen and examined the patient at bedside. Agree with the above note with 

the following addition/ exceptions: Briefly this is 58 year old male with a 

past medical history significant for CABG, DM-2, active smoking and heroin use 

who came for evaluation of chest pain. He was found to have NSTEMI. Troponins 

are elevated. Life vest in place. Patient does not have any chest pain. Follow 

up echo. Discussed with cardiologist. EP consult pending. Electrolytes 

replaced. Bradycardia noticed. Lopressor and nitroglycerine held. Counselling 

provided. Upon discharge patient will follow up with Dr Davenport.

## 2018-05-24 NOTE — PN
DATE:  05/24/2018



SUBJECTIVE:  The patient denies any chest pain or dizziness.  The patient

had his external LifeVest battery charged at home and was brought in by his

mother.



PHYSICAL EXAMINATION:

VITAL SIGNS:  Blood pressure 121/65, heart rate 52, temperature 97.4,

respirations 18.

HEENT:  Normocephalic.

CHEST:  Clear.

HEART:  S1, S2 regular.

EXTREMITIES:  No edema.



LABORATORY DATA:  Hemoglobin and hematocrit 11.2 and 34, white count 11.6,

platelet count is 340,000.  SMA-7 shows sodium 146, potassium _____,

chloride 115, CO2 of 19, glucose 58, BUN 7, creatinine 0.5.  Magnesium 1.4,

calcium 6.6.  Chest CT angio, unremarkable.  CT pulmonary angiogram, no

pulmonary embolus.  Echocardiac study revealed normal ejection fraction. 

Mild septal hypokinesis consistent with recent surgery and mild pulmonary

hypertension.



ASSESSMENT AND PLAN:

1.  Non-ST elevation myocardial infarction.

2.  History of cardiac arrest on presentation in March of this year

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

3.  Hypomagnesemia, hypokalemia, and hypocalcemia.

4.  Heroin abuse.



RECOMMENDATIONS:  Discontinue intravenous heparin.  Continue aspirin 81 mg

once a day, Plavix 75 mg once a day, _____ 40 mg once a day and discontinue

Lopressor in view of sinus tachycardia.  Discontinue hydrochlorothiazide. 

Both potassium and magnesium are being supplemented.  Continue Zestril 20

mg once a day.  Awaiting electrophysiology evaluation and the patient was

strongly instructed to follow up with the electrophysiologist at [facility] as he has an appointment, which he missed.  Decision

of further assist placement or ICD placement will be deferred to the

electrophysiology team at [facility].







__________________________________________

Bakari Macedo MD



DD:  05/24/2018 11:01:38

DT:  05/24/2018 12:05:13

Job # [id number]

## 2018-05-24 NOTE — CARD
--------------- APPROVED REPORT --------------





EKG Measurement

Heart [id number]

MT 146P77

[id number]

[id number]

[id number]



<Conclusion>

Sinus bradycardia

Otherwise normal ECG

## 2018-05-25 VITALS
DIASTOLIC BLOOD PRESSURE: 80 MMHG | TEMPERATURE: 98.5 F | HEART RATE: 64 BPM | RESPIRATION RATE: 18 BRPM | SYSTOLIC BLOOD PRESSURE: 158 MMHG

## 2018-05-25 VITALS — OXYGEN SATURATION: 99 %

## 2018-05-25 LAB
ALBUMIN SERPL-MCNC: 3.9 G/DL (ref 3–4.8)
ALBUMIN/GLOB SERPL: 1.1 {RATIO} (ref 1.1–1.8)
ALT SERPL-CCNC: 43 U/L (ref 7–56)
AST SERPL-CCNC: 42 U/L (ref 17–59)
BASOPHILS # BLD AUTO: 0.02 K/MM3 (ref 0–2)
BASOPHILS NFR BLD: 0.2 % (ref 0–3)
BUN SERPL-MCNC: 5 MG/DL (ref 7–21)
CALCIUM SERPL-MCNC: 9 MG/DL (ref 8.4–10.5)
EOSINOPHIL # BLD: 0.1 10*3/UL (ref 0–0.7)
EOSINOPHIL NFR BLD: 0.5 % (ref 1.5–5)
ERYTHROCYTE [DISTWIDTH] IN BLOOD BY AUTOMATED COUNT: 14.7 % (ref 11.5–14.5)
GFR NON-AFRICAN AMERICAN: > 60
GRANULOCYTES # BLD: 7.82 10*3/UL (ref 1.4–6.5)
GRANULOCYTES NFR BLD: 67.3 % (ref 50–68)
HGB BLD-MCNC: 12.1 G/DL (ref 14–18)
LYMPHOCYTES # BLD: 3 10*3/UL (ref 1.2–3.4)
LYMPHOCYTES NFR BLD AUTO: 26.1 % (ref 22–35)
MCH RBC QN AUTO: 28.9 PG (ref 25–35)
MCHC RBC AUTO-ENTMCNC: 33.8 G/DL (ref 31–37)
MCV RBC AUTO: 85.4 FL (ref 80–105)
MONOCYTES # BLD AUTO: 0.7 10*3/UL (ref 0.1–0.6)
MONOCYTES NFR BLD: 5.9 % (ref 1–6)
PLATELET # BLD: 369 10^3/UL (ref 120–450)
PMV BLD AUTO: 10 FL (ref 7–11)
RBC # BLD AUTO: 4.19 10^6/UL (ref 3.5–6.1)
WBC # BLD AUTO: 11.6 10^3/UL (ref 4.5–11)

## 2018-05-25 RX ADMIN — ENOXAPARIN SODIUM SCH MG: 40 INJECTION SUBCUTANEOUS at 10:03

## 2018-05-25 RX ADMIN — INSULIN HUMAN SCH: 100 INJECTION, SOLUTION PARENTERAL at 11:38

## 2018-05-25 RX ADMIN — Medication SCH MG: at 09:57

## 2018-05-25 RX ADMIN — INSULIN HUMAN SCH: 100 INJECTION, SOLUTION PARENTERAL at 17:28

## 2018-05-25 RX ADMIN — INSULIN HUMAN SCH: 100 INJECTION, SOLUTION PARENTERAL at 08:05

## 2018-05-25 NOTE — CP.PCM.DIS
Provider





- Provider


Date of Admission: 


05/23/18 15:57





Attending physician: 


Nasir Zelaya MD





Primary care physician: 


Sneha Davenport MD





Consults: 





Electrophysiology


Cardiology


Time Spent in preparation of Discharge (in minutes): 35





Hospital Course





- Lab Results


Lab Results: 


 Most Recent Lab Values











WBC  11.6 10^3/ul (4.5-11.0)  H  05/25/18  06:00    


 


RBC  4.19 10^6/uL (3.5-6.1)   05/25/18  06:00    


 


Hgb  12.1 g/dL (14.0-18.0)  L  05/25/18  06:00    


 


Hct  35.8 % (42.0-52.0)  L  05/25/18  06:00    


 


MCV  85.4 fl (80.0-105.0)   05/25/18  06:00    


 


MCH  28.9 pg (25.0-35.0)   05/25/18  06:00    


 


MCHC  33.8 g/dl (31.0-37.0)   05/25/18  06:00    


 


RDW  14.7 % (11.5-14.5)  H  05/25/18  06:00    


 


Plt Count  369 10^3/uL (120.0-450.0)   05/25/18  06:00    


 


MPV  10.0 fl (7.0-11.0)   05/25/18  06:00    


 


Gran %  67.3 % (50.0-68.0)   05/25/18  06:00    


 


Lymph % (Auto)  26.1 % (22.0-35.0)   05/25/18  06:00    


 


Mono % (Auto)  5.9 % (1.0-6.0)   05/25/18  06:00    


 


Eos % (Auto)  0.5 % (1.5-5.0)  L  05/25/18  06:00    


 


Baso % (Auto)  0.2 % (0.0-3.0)   05/25/18  06:00    


 


Gran #  7.82  (1.4-6.5)  H  05/25/18  06:00    


 


Lymph # (Auto)  3.0  (1.2-3.4)   05/25/18  06:00    


 


Mono # (Auto)  0.7  (0.1-0.6)  H  05/25/18  06:00    


 


Eos # (Auto)  0.1  (0.0-0.7)   05/25/18  06:00    


 


Baso # (Auto)  0.02 K/mm3 (0.0-2.0)   05/25/18  06:00    


 


PT  13.2 SECONDS (9.4-12.5)  H  05/23/18  15:55    


 


INR  1.15  (0.93-1.08)  H  05/23/18  15:55    


 


APTT  29.8 Seconds (25.1-36.5)   05/25/18  06:00    


 


Sodium  144 mmol/L (132-148)   05/25/18  06:00    


 


Potassium  3.9 mmol/L (3.6-5.0)   05/25/18  06:00    


 


Chloride  108 mmol/L ()  H  05/25/18  06:00    


 


Carbon Dioxide  22 mmol/L (21-33)   05/25/18  06:00    


 


Anion Gap  18  (10-20)   05/25/18  06:00    


 


BUN  5 mg/dL (7-21)  L  05/25/18  06:00    


 


Creatinine  0.7 mg/dl (0.8-1.5)  L  05/25/18  06:00    


 


Est GFR ( Amer)  > 60   05/25/18  06:00    


 


Est GFR (Non-Af Amer)  > 60   05/25/18  06:00    


 


POC Glucose (mg/dL)  98 mg/dL ()   05/25/18  11:29    


 


Random Glucose  99 mg/dL ()   05/25/18  06:00    


 


Hemoglobin A1c  6.3 % (4.2-6.5)   05/22/18  21:23    


 


Calcium  9.0 mg/dL (8.4-10.5)   05/25/18  06:00    


 


Phosphorus  3.4 mg/dL (2.5-4.5)   05/25/18  06:00    


 


Magnesium  1.6 mg/dL (1.7-2.2)  L  05/25/18  06:00    


 


Total Bilirubin  0.4 mg/dL (0.2-1.3)   05/25/18  06:00    


 


AST  42 U/L (17-59)   05/25/18  06:00    


 


ALT  43 U/L (7-56)   05/25/18  06:00    


 


Alkaline Phosphatase  137 U/L ()  H  05/25/18  06:00    


 


Lactate Dehydrogenase  437 U/L (333-699)   05/22/18  21:23    


 


Total Creatine Kinase  151 U/L ()   05/22/18  21:23    


 


Troponin I  0.02 ng/mL D 05/24/18  06:20    


 


NT-Pro-B Natriuret Pep  478 pg/mL (0-450)  H  05/22/18  21:23    


 


Total Protein  7.3 g/dL (5.8-8.3)   05/25/18  06:00    


 


Albumin  3.9 g/dL (3.0-4.8)   05/25/18  06:00    


 


Globulin  3.4 gm/dL  05/25/18  06:00    


 


Albumin/Globulin Ratio  1.1  (1.1-1.8)   05/25/18  06:00    


 


Triglycerides  77 mg/dL ()   05/22/18  21:23    


 


Cholesterol  112 mg/dL (130-200)  L  05/22/18  21:23    


 


LDL Cholesterol Direct  60 mg/dL (0-129)   05/22/18  21:23    


 


HDL Cholesterol  32 mg/dL (29-60)   05/22/18  21:23    


 


TSH 3rd Generation  2.03 mIU/mL (0.46-4.68)   05/22/18  21:23    


 


Urine Color  Yellow  (YELLOW)   05/22/18  22:58    


 


Urine Appearance  Clear  (CLEAR)   05/22/18  22:58    


 


Urine pH  7.0  (4.7-8.0)   05/22/18  22:58    


 


Ur Specific Gravity  1.020  (1.005-1.035)   05/22/18  22:58    


 


Urine Protein  Trace mg/dL (<30 mg/dL)  H  05/22/18  22:58    


 


Urine Glucose (UA)  Negative mg/dL (NEGATIVE)   05/22/18  22:58    


 


Urine Ketones  Negative mg/dL (NEGATIVE)   05/22/18  22:58    


 


Urine Blood  Negative  (NEGATIVE)   05/22/18  22:58    


 


Urine Nitrate  Negative  (NEGATIVE)   05/22/18  22:58    


 


Urine Bilirubin  Negative  (NEGATIVE)   05/22/18  22:58    


 


Urine Urobilinogen  0.2 E.U./dL (<1 E.U./dL)   05/22/18  22:58    


 


Ur Leukocyte Esterase  Negative Sudha/uL (NEGATIVE)   05/22/18  22:58    


 


Urine RBC  0 - 2 /hpf (0-2)   05/22/18  22:58    


 


Urine WBC  0 - 2 /hpf (0-6)   05/22/18  22:58    


 


Ur Epithelial Cells  0 - 2 /hpf (0-5)   05/22/18  22:58    


 


Hyaline Casts  0 - 2 /hpf  05/22/18  22:58    


 


Urine Other  Usperm   05/22/18  22:58    


 


Urine Opiates Screen  Positive  (NEGATIVE)  H  05/22/18  22:58    


 


Urine Methadone Screen  Negative  (NEGATIVE)   05/22/18  22:58    


 


Ur Barbiturates Screen  Negative  (NEGATIVE)   05/22/18  22:58    


 


Ur Phencyclidine Scrn  Negative  (NEGATIVE)   05/22/18  22:58    


 


Ur Amphetamines Screen  Negative  (NEGATIVE)   05/22/18  22:58    


 


U Benzodiazepines Scrn  Negative  (NEGATIVE)   05/22/18  22:58    


 


U Oth Cocaine Metabols  Negative  (NEGATIVE)   05/22/18  22:58    


 


U Cannabinoids Screen  Negative  (NEGATIVE)   05/22/18  22:58    














- Hospital Course


Hospital Course: 








58 year old  male with a past medical history of  CABG (with a 

life vest), DM II, heroin abuse (sniffs), tobacco abuse who presents to [facility] for

  burning chest discomfort, associated dyspnea, and racing sensation in his 

heart after walking one block after he got off the bus. He reports being in his 

normal health, eating some spicy chicken for dinner and then  catching the bus 

and getting off [place] on [address]. He reports walking after walking one 

block and then feeling some burning, chest discomfort. He denies experiencing 

any nausea, diaphoresis, radiation of the pain, or worsening of symptoms 

associated with exertion. His chest pain, was not pressure like. He reports it 

did respond to topical NG given to him in the ED. He reports taking all of his 

medications. He is still smoking cigarretes (3 a day) and still doing heroin 

once or twice a week. Last did heroin 2 days ago.  He reports following up with 

his PMD and cardiologist, but cannot recall which medications he is on. He 

denies fever, chills, palpitations, nausea, vomiting, syncope, light 

headedness. He denies any precipitating factors. 





Pt admitted for chest pain, r/o ACS.  Trops bumped to 0.16, then resolved. CXR 

was negative.  Serial EKGs done, reviewed by cardiology. TSH 2.03- normal. 


Hgb A1c 6.3%. Lipid panel mostly WNL- cholesterol slightly low at 112. BNP high 

at 478. Started on ASA, toprol. Diabetic meds and diet started.  CHF & HTN home 

meds re-started. Echo done w/EF 64%, normal size LV, normal LV wall thickness, 

septal motion consistent w/post op state, mild MR, mild pulmonary HTN.  

Hospital day X pt with episode of heart block/bradycardia overnight- nitro & 

metroprolol were held. HCTZ was held due to electrolyte disturbance- 

electrolytes were repleted. CTA done to R/O PE- negative. Pt seen/evaluated by 

cardiology with recommendations for ASA, Plavix, hold Lopressor due to sinus 

bradycardia, d/c HCTZ, continue Zestril, FU w/electrophysiology due to missing 

FU appointment after CABG, FU w/electrophysiology team at [facility] where pt had 

surgery for possible AICD or further intervention. EKG w/sinus bradycardia. 

Electrophysiology consulted due to life vest for ventricular arrhythmias- 

requested that pt's life vest be interrogated, recommended that pt follow up 

with EP at [facility] who placed the vest. Life vest interrogated.  Pt stable 

and ready for discharge.  








Diagnoses: 


chest pain- NSTEMI


s/p CABG


HTN


Hypokalemia


Hypomagnesemia


CHF


HTN


DM








- Date & Time of H&P


Date of H&P: 05/22/18


Time of H&P: 22:58





Discharge Exam





- Head Exam


Head Exam: ATRAUMATIC, NORMAL INSPECTION, NORMOCEPHALIC





- Eye Exam


Eye Exam: EOMI, Normal appearance





- ENT Exam


ENT Exam: Mucous Membranes Moist, Normal Exam





- Neck Exam


Neck exam: Full Rom, Normal Inspection





- Respiratory Exam


Respiratory Exam: Clear to PA & Lateral, NORMAL BREATHING PATTERN, UNREMARKABLE





- Cardiovascular Exam


Cardiovascular Exam: REGULAR RHYTHM, +S1, +S2


Additional comments: 





midline scar over anterior thorax well healed





- GI/Abdominal Exam


GI & Abdominal Exam: Normal Bowel Sounds, Soft, Unremarkable.  absent: 

Tenderness





- Extremities Exam


Extremities exam: normal inspection





- Neurological Exam


Neurological exam: Alert, CN II-XII Intact, Oriented x3





- Psychiatric Exam


Psychiatric exam: Normal Affect, Normal Mood





- Skin


Skin Exam: Dry, Intact, Normal Color, Warm





Discharge Plan





- Discharge Medications


Prescriptions: 


Clopidogrel [Plavix] 75 mg PO DAILY #30 tab


Metoprolol Succinate XL [Toprol XL] 25 mg PO BRK #30 tab





- Follow Up Plan


Condition: STABLE


Disposition: HOME/ ROUTINE


Instructions:  Heart Healthy Diet, Drug Abuse and Drug Addiction (DC), Chest 

Pain (DC)


Additional Instructions: 


Please only take the Lopressor once a day due to slow heart rate- go to see 

your cardiologist to see if it needs to be changed more.  You are being started 

on Plavix for your blood and heart, please take as prescribed, it was sent 

electronically to [facility]. If you have any excessive bleeding, 

please come to the hospital.  Please follow up with your cardiologist, primary 

care provider and the electrophysiology team at [facility] in 1-2  weeks after 

discharge.  Please take all your home meds as specified and stop the ones as 

specified.  If you have a recurrence of chest pain, please come back to the 

hospital. Please stop using heroin. 


Referrals: 


Sneha Davenport MD [Primary Care Provider] -

## 2018-05-26 NOTE — CON
DATE:  05/25/2018



INPATIENT ELECTROPHYSIOLOGY CONSULTATION



REASON FOR EVALUATION:

1.  History of VT arrest.

2.  Ischemic cardiomyopathy.

3.  Bradycardia.

4.  History of obstructive sleep apnea.

5.  Heroin abuse.



Thank you very much for this consult.



HISTORY OF PRESENT ILLNESS:  Mr. Pradeep Manning is a colorful 58-year-old

 male with past medical history significant for coronary

artery disease, status post coronary artery bypass graft surgery, cardiac

arrest, heroin abuse, who presents to [facility] on the day of

admission, 05/23/2018, with worsening shortness of breath and chest

tightness.  Patient had been using snorted heroin prior to presenting to

[facility] in 03/2017 in cardiac arrest.  He underwent

resuscitation, subsequently found to have critical left main stenosis and

ostial LAD lesion, circumflex and RCA stenosis as well.  Patient ultimately

underwent coronary artery bypass graft surgery in the setting of prior

cardiac arrest.  Patient was not benefit from wearing a LifeVest at the

time of discharge.  Patient has not followed up with any physicians since

being discharged from the hospital.  Patient has been intermittently

compliant with LifeVest, has not received any discharges.  At one point,

device did  artifact as confirmed by downloaded history through the

LifeVest website, at which point, the alarm did sound, patient panicked and

removed the battery from the LifeVest because he was afraid he was going to

get shock.  Subsequently, he was counseled that this is a feature of the

LifeVest and that he could manually disable the LifeVest without removing

the battery if felt that he would going to be shocked in error.  Therefore,

patient had been intermittently compliant with the LifeVest.  Of note, the

patient continues to still use snorted heroin and admits using heroin

approximately 1 week ago.  Again, he presented with chest tightness and

worsening shortness of breath.



Patient was noted to have episodes of nocturnal bradycardia with heart

rates in the 40s transiently with up to a 3.2-second pause.  I was

consulted in regards to further management including management of LifeVest

as well as bradycardia.



SOCIAL HISTORY:  Patient is again a smoker and heroin snorter.  Electronic

medical records indicate that he continues to use heroin even more recently

than he had stated to me.



MEDICATIONS AT THE TIME OF ADMISSION:  Include aspirin and intravenous

heparin for presumed acute coronary syndrome, hydrochlorothiazide and

Lipitor 80 mg p.o. daily, Lopressor 25 mg twice a day and Zestril.



PHYSICAL EXAMINATION:

GENERAL:  Patient is a pleasant, but somewhat ill-appearing, poorly kept

 male, in no acute distress, able to speak in complete

sentences.

HEENT:  Examination of his head is normocephalic and atraumatic.  He has

poor dentition.

NECK:  Supple.  No jugular venous distention.  No carotid bruits.

CHEST:  Clear to auscultation bilaterally.

CARDIOVASCULAR:  Regular rate and rhythm.  S1 and S2.  No S3 or S4, with a

healed sternotomy.

ABDOMEN:  Soft, nontender, nondistended.  Positive bowel sounds.

EXTREMITIES:  No cyanosis, clubbing or edema.



DIAGNOSTIC DATA:  X-ray showed normal cardiac silhouette, mild CHF changes

are noted.  EKG:  Normal sinus rhythm with nonspecific ST-T wave changes. 

QT, QTc appears to be within normal limits.



LABORATORY RESULTS:  Patient did present with borderline troponins.  CBC is

within normal limits.  Urine drug screen is, of course, positive for

opioids.



Patient had a 2D echocardiogram, which showed an ejection fraction of 64%

with normal left ventricular size, normal left ventricular thickness. 

Ejection fraction is within normal range.  Septal motion consistent with

poor postoperative state.  Transmitral Doppler flow is abnormal.  Aortic

valve is without aortic stenosis or aortic regurgitation.  Mild mitral

regurgitation is noted.  Tricuspid valve is within normal limits.



Again, EKG is noted for T-wave inversions in the anterior precordial lead. 

There is some evidence of slight delta wave pattern in the inferior leads,

which may due to a possible preexcitation pattern involving what is likely

a septal pathway.  Once again, QT, QTc is 364 and 455 respectively.



ASSESSMENT AND PLAN:

1.  History of ventricular tachycardia arrest in the setting of heroin use

and 3-vessel coronary artery disease.  Patient has been relatively quiet

since this time.  Patient has been maintained on reasonable medical

therapy, which includes b.i.d. dosing of metoprolol.  Telemetry findings of

nocturnal bradycardia may be secondary to underlying sleep apnea, which the

patient does admit to.  Patient should be seen and evaluated further and is

treated at [facility].  Patient is advised to continue

wearing his LifeVest.  Patient subsequently may be risk stratified with an

electrophysiology study, which should and could be considered by his

managing Cardiologist.  These options were discussed with him.  At this

point, patient is under the impression that he would not require an

implantable defibrillator at any time.  Patient is strongly encouraged to

stop using heroin.  Heroin can certainly prolong QT and be proarrhythmic.

2.  Ischemic cardiomyopathy.  Patient appears to have a normal ejection

fraction.  It is unclear if patient had a global ischemic process during

the perioperative period.  Again, patient is advised to follow up with his

physicians at [facility].

3.  Bradycardia.  Nature of bradycardia likely secondary to obstructive

sleep apnea.  At this point, we will recommend changing his metoprolol 25

mg b.i.d. to Toprol-XL 25 mg one p.o. every morning.  Again, I have

discussed this with medical team.  Those changes will be performed.

4.  Heroin abuse.  Patient is advised strongly stop using heroin.  He

realizes the challenges involved with that.  He is seeking help through his

primary doctor, a detailed discussion has been had to this end.



Thank you for allowing me to participate in the care of your patient. 

Please do not hesitate to call for any questions in regards to his care.





__________________________________________

Pete Cornejo MD



cc:  Nasir Zelaya MD, Bakari Macedo MD



DD:  05/25/2018 18:27:50

DT:  05/26/2018 1:30:24

Job # [id number]

## 2018-06-26 ENCOUNTER — HOSPITAL ENCOUNTER (INPATIENT)
Dept: HOSPITAL 42 - ED | Age: 58
Discharge: ANOTHER HOSPITAL | DRG: 121 | End: 2018-06-26
Attending: INTERNAL MEDICINE | Admitting: INTERNAL MEDICINE
Payer: COMMERCIAL

## 2018-06-26 VITALS — SYSTOLIC BLOOD PRESSURE: 125 MMHG | HEART RATE: 53 BPM | DIASTOLIC BLOOD PRESSURE: 68 MMHG

## 2018-06-26 VITALS — OXYGEN SATURATION: 100 %

## 2018-06-26 VITALS — TEMPERATURE: 98 F

## 2018-06-26 VITALS — BODY MASS INDEX: 22.2 KG/M2

## 2018-06-26 VITALS — RESPIRATION RATE: 18 BRPM

## 2018-06-26 DIAGNOSIS — Z79.82: ICD-10-CM

## 2018-06-26 DIAGNOSIS — I25.5: ICD-10-CM

## 2018-06-26 DIAGNOSIS — Z95.5: ICD-10-CM

## 2018-06-26 DIAGNOSIS — E11.51: ICD-10-CM

## 2018-06-26 DIAGNOSIS — I27.20: ICD-10-CM

## 2018-06-26 DIAGNOSIS — I25.810: ICD-10-CM
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
## 2018-06-26 DIAGNOSIS — Z87.891: ICD-10-CM

## 2018-06-26 DIAGNOSIS — F11.10: ICD-10-CM

## 2018-06-26 DIAGNOSIS — I10: ICD-10-CM

## 2018-06-26 DIAGNOSIS — Z79.4: ICD-10-CM

## 2018-06-26 DIAGNOSIS — Z91.19: ICD-10-CM

## 2018-06-26 DIAGNOSIS — Z86.74: ICD-10-CM

## 2018-06-26 DIAGNOSIS — I21.09: ICD-10-CM

## 2018-06-26 DIAGNOSIS — E78.5: ICD-10-CM

## 2018-06-26 DIAGNOSIS — Z83.3: ICD-10-CM

## 2018-06-26 DIAGNOSIS — T82.857A: Primary | ICD-10-CM

## 2018-06-26 LAB
ALBUMIN SERPL-MCNC: 3.9 G/DL (ref 3–4.8)
ALBUMIN/GLOB SERPL: 1.2 {RATIO} (ref 1.1–1.8)
ALT SERPL-CCNC: 35 U/L (ref 7–56)
APPEARANCE UR: CLEAR
APTT BLD: 29.7 SECONDS (ref 25.1–36.5)
AST SERPL-CCNC: 51 U/L (ref 17–59)
BASE EXCESS BLDV CALC-SCNC: -1.4 MMOL/L (ref 0–2)
BASE EXCESS BLDV CALC-SCNC: 1 MMOL/L (ref 0–2)
BASOPHILS # BLD AUTO: 0.03 K/MM3 (ref 0–2)
BASOPHILS NFR BLD: 0.3 % (ref 0–3)
BILIRUB UR-MCNC: NEGATIVE MG/DL
BUN SERPL-MCNC: 19 MG/DL (ref 7–21)
CALCIUM SERPL-MCNC: 9 MG/DL (ref 8.4–10.5)
COLOR UR: YELLOW
EOSINOPHIL # BLD: 0.2 10*3/UL (ref 0–0.7)
EOSINOPHIL NFR BLD: 1.6 % (ref 1.5–5)
ERYTHROCYTE [DISTWIDTH] IN BLOOD BY AUTOMATED COUNT: 14.9 % (ref 11.5–14.5)
GFR NON-AFRICAN AMERICAN: > 60
GLUCOSE UR STRIP-MCNC: NEGATIVE MG/DL
GRANULOCYTES # BLD: 7.37 10*3/UL (ref 1.4–6.5)
GRANULOCYTES NFR BLD: 63.7 % (ref 50–68)
HGB BLD-MCNC: 13.2 G/DL (ref 14–18)
INR PPP: 0.97 (ref 0.93–1.08)
LEUKOCYTE ESTERASE UR-ACNC: NEGATIVE LEU/UL
LYMPHOCYTES # BLD: 3.2 10*3/UL (ref 1.2–3.4)
LYMPHOCYTES NFR BLD AUTO: 27.3 % (ref 22–35)
MCH RBC QN AUTO: 30.3 PG (ref 25–35)
MCHC RBC AUTO-ENTMCNC: 34.1 G/DL (ref 31–37)
MCV RBC AUTO: 89 FL (ref 80–105)
MONOCYTES # BLD AUTO: 0.8 10*3/UL (ref 0.1–0.6)
MONOCYTES NFR BLD: 7.1 % (ref 1–6)
PH BLDV: 7.36 [PH] (ref 7.32–7.43)
PH BLDV: 7.41 [PH] (ref 7.32–7.43)
PH UR STRIP: 6.5 [PH] (ref 4.7–8)
PLATELET # BLD: 338 10^3/UL (ref 120–450)
PMV BLD AUTO: 10.5 FL (ref 7–11)
PROT UR STRIP-MCNC: NEGATIVE MG/DL
PROTHROMBIN TIME: 11.1 SECONDS (ref 9.4–12.5)
RBC # BLD AUTO: 4.35 10^6/UL (ref 3.5–6.1)
RBC # UR STRIP: NEGATIVE /UL
SP GR UR STRIP: 1.01 (ref 1–1.03)
TROPONIN I SERPL-MCNC: 0.08 NG/ML
TROPONIN I SERPL-MCNC: 1 NG/ML
UROBILINOGEN UR STRIP-ACNC: 0.2 E.U./DL
VENOUS BLOOD FIO2: 21 %
VENOUS BLOOD FIO2: 21 %
VENOUS BLOOD GAS PCO2: 36 (ref 40–60)
VENOUS BLOOD GAS PCO2: 48 (ref 40–60)
VENOUS BLOOD GAS PO2: 163 MM/HG (ref 30–55)
VENOUS BLOOD GAS PO2: 26 MM/HG (ref 30–55)
WBC # BLD AUTO: 11.6 10^3/UL (ref 4.5–11)

## 2018-06-26 PROCEDURE — B2131ZZ FLUOROSCOPY OF MULTIPLE CORONARY ARTERY BYPASS GRAFTS USING LOW OSMOLAR CONTRAST: ICD-10-PCS | Performed by: INTERNAL MEDICINE

## 2018-06-26 PROCEDURE — B2121ZZ FLUOROSCOPY OF SINGLE CORONARY ARTERY BYPASS GRAFT USING LOW OSMOLAR CONTRAST: ICD-10-PCS | Performed by: INTERNAL MEDICINE

## 2018-06-26 PROCEDURE — B2151ZZ FLUOROSCOPY OF LEFT HEART USING LOW OSMOLAR CONTRAST: ICD-10-PCS | Performed by: INTERNAL MEDICINE

## 2018-06-26 PROCEDURE — 4A023N7 MEASUREMENT OF CARDIAC SAMPLING AND PRESSURE, LEFT HEART, PERCUTANEOUS APPROACH: ICD-10-PCS | Performed by: INTERNAL MEDICINE

## 2018-06-26 PROCEDURE — 3E043PZ INTRODUCTION OF PLATELET INHIBITOR INTO CENTRAL VEIN, PERCUTANEOUS APPROACH: ICD-10-PCS | Performed by: INTERNAL MEDICINE

## 2018-06-26 NOTE — CP.PCM.HP
<AletaBreanna - Last Filed: 18 13:53>





History of Present Illness





- History of Present Illness


History of Present Illness: 





Breanna River, PGy1, H&P for Dr Freedman:





58 year old male with PMH HTN, CABG s/p cardiac arrest, DM, heroin abuser, 

previous smoker, presents for midsternal chest pain that started at 9 AM this 

morning. Pt describes the pain as burning in nature, radiating to rigth arm, 

constant, has associated sob, diaphoresis. Denies headache, dizziness, 

epigastric pain, nausea, vomiting, abdominal pain, urinary symptoms, leg 

swelling. Pt states that he has had this chest pain for a month now, but 

exacerbated today. Pt recently had a CABG at [facility] in 3/2018, follows up with 

Cardiologist Dr Almanza. Pt reports that he is compliant with his home meds. Echo 

2018 shows ef 64, mild pulm HTN. 





In ED, EKG showed anterior, inferior wall MI, code heart called. Received ASA 

325 mg, Plavix, integrillin bolus. Cardiac cath with Dr Cherry showed critically 

stenosed 4 grafts. Started on heaprin drip and nitroglycerin drip. Patient to 

be transferred to [facility] physician Dr Guillen.





12 point ROS obtained and neg, except as per hPI.





PMH: CABG s/p cardiac arrest, HTN/HLD, On lifevest, DM2, Active smoking, heroin 

abuse


PSH: Exploratory laparotomy due to Rupture appendix, CABG - 4 vessel


All: NKDA


FH: Mom, DM; Dad- MI,  age 60


SH: Lives with friend. quit tobacco 3 months ago (only 1 ciggarette/day), 

previously 1ppd x  20 years; Denied ETOH. Heroine use 1 bag "every other day," 

previous use 2 days ago


Meds: Reviewed





PMD: Dr Shilpa Loco


Pharm: [facility]


Cardiology Dr. AMPARO Lantigua, [facility]





Present on Admission





- Present on Admission


Any Indicators Present on Admission: No


History of DVT/PE: No


History of Uncontrolled Diabetes: No


Urinary Catheter: No


Decubitus Ulcer Present: No





Review of Systems





- Review of Systems


All systems: reviewed and no additional remarkable complaints except


Review of Systems: 





as per HPI





Past Patient History





- Infectious Disease


Hx of Infectious Diseases: None





- Past Social History


Smoking Status: Former Smoker





- CARDIAC


Hx Hypertension: Yes


Other/Comment: CABG, LIFEVEST





- PULMONARY


Hx Respiratory Disorders: Yes


Other/Comment: SMOKER





- NEUROLOGICAL


Hx Neurological Disorder: Yes





- HEENT


Hx HEENT Problems: No





- RENAL


Hx Chronic Kidney Disease: No





- ENDOCRINE/METABOLIC


Hx Diabetes Mellitus Type 2: Yes





- HEMATOLOGICAL/ONCOLOGICAL


Hx Hepatitis B: Yes





- INTEGUMENTARY


Hx Dermatological Problems: No





- MUSCULOSKELETAL/RHEUMATOLOGICAL


Hx Falls: Yes





- GASTROINTESTINAL


Other/Comment: ULCER





- GENITOURINARY/GYNECOLOGICAL


Hx Genitourinary Disorders: No





- PSYCHIATRIC


Hx Substance Use: No





- SURGICAL HISTORY


Hx Appendectomy: Yes


Hx Open Heart Surgery: Yes (CABG )





- ANESTHESIA


Hx Anesthesia: Yes


Hx Anesthesia Reactions: No


Hx Malignant Hyperthermia: No





Meds


Home Medications: 


 Home Medication List











 Medication  Instructions  Recorded  Confirmed  Type


 


Heparin Sod,Pork in 0.45% NaCl 25,000 unit IV DAILY 2 Days 18  Rx





[Heparin 25,000 Unit/250-1/2 Ns] iv.soln   


 


Insulin Human Regular-LOW [HumuLIN 0 units SC ACHS  ml 18  Rx





R LOW]    


 


Nitroglycerin 50mg in D5W 50 mg IV DAILY 2 Days  bag 18  Rx





[Nitroglycerin 50 mg/250 ml D5W]    











Allergies/Adverse Reactions: 


 Allergies











Allergy/AdvReac Type Severity Reaction Status Date / Time


 


No Known Allergies Allergy   Verified 18 20:55














Physical Exam





- Constitutional


Appears: Non-toxic, No Acute Distress





- Head Exam


Head Exam: ATRAUMATIC, NORMOCEPHALIC





- Eye Exam


Eye Exam: EOMI, PERRL.  absent: Conjunctival injection, Nystagmus, Scleral 

icterus


Pupil Exam: NORMAL ACCOMODATION, PERRL.  absent: Fixed, Irregular, Unequal





- ENT Exam


ENT Exam: Mucous Membranes Moist





- Neck Exam


Neck exam: Positive for: Full Rom





- Respiratory Exam


Respiratory Exam: Clear to Auscultation Bilateral, NORMAL BREATHING PATTERN.  

absent: Accessory Muscle Use, Rhonchi, Wheezes, Stridor





- Cardiovascular Exam


Cardiovascular Exam: RRR, +S1, +S2.  absent: Systolic Murmur





- GI/Abdominal Exam


GI & Abdominal Exam: Normal Bowel Sounds, Soft.  absent: Distended, Firm, 

Guarding, Mass, Organomegaly, Rigid, Tenderness





- Extremities Exam


Extremities exam: Positive for: normal inspection.  Negative for: calf 

tenderness, pedal edema


Additional comments: 





+ cardiac sheath in right leg, dressing intact





- Back Exam


Back exam: NORMAL INSPECTION





- Neurological Exam


Neurological exam: Alert, Oriented x3





- Psychiatric Exam


Psychiatric exam: Normal Affect, Normal Mood





- Skin


Skin Exam: Dry, Normal Color, Warm





Results





- Vital Signs


Recent Vital Signs: 





 Last Vital Signs











Temp  98 F   18 11:39


 


Pulse  70   18 13:08


 


Resp  18   18 13:08


 


BP  125/58 L  18 13:08


 


Pulse Ox  100   18 12:50














- Labs


Result Diagrams: 


 18 10:42





 18 10:35





Assessment & Plan





- Assessment and Plan (Free Text)


Assessment: 





58 year old male with PMH CABG s/p cardiac arrest, HTN/HLD, On lifevest, DM2, 

Active smoking, heroin abuse, presents for chest pain, found to have inferior-

anterior wall MI, s/p cardiac cath showing 4 grafts critically stenoses, 

awaiting transfer to [facility]:





Acute MI:


- EKG HR 70. ST-elevation aVR, lead I, II


- trop 0.08, serial trops and ekg q8


- Received  mg, Plavix and integrillin bolus in ED


- s/p cardiac cath


- Started on heparin drip and nitroglycerin drip, will hold for SBP<90


- EMTALA form ready, accepting physician Dr Guillen


- Monitor


- NPO


- continue ICU monitoring.





Hx of Heroin abuse:


- advised cessation





Hx of DM:


- NPO


- ISS





Dispo: transfer to [facility]





Case seen and discussed with Dr Freedman.





- Date & Time


Date: 18


Time: 13:53





<Ga Freedman - Last Filed: 18 17:00>





Results





- Vital Signs


Recent Vital Signs: 





 Last Vital Signs











Temp  98 F   18 11:43


 


Pulse  53 L  18 18:10


 


Resp  18   18 18:10


 


BP  125/68   18 18:00


 


Pulse Ox  100   18 16:50














- Labs


Result Diagrams: 


 18 10:42





 18 10:35





Attending/Attestation





- Attestation


I have personally seen and examined this patient.: Yes


I have fully participated in the care of the patient.: Yes


I have reviewed all pertinent clinical information: Yes


Notes (Text): 





18 16:57





Attending note;





Patient seen and examined with resident in ICU.





Patient is a 58 year old male with PMH of  HTN, CABG s/p cardiac arrest, DM, 

heroin abuser, previous smoker, presents for midsternal chest pain that started 

at 9 AM this morning.  Code Heart was called. Patient had emergent cardiac cath.


cardiac cath showing 4 grafts critically stenoses, awaiting transfer to [facility].


Patient is clinically stable.


Right groin without significant bleeding.





Currently on nitroglycerin and heparin drip.





EMTALA form completed.





Awaiting for a bed.





Transfer to [facility] when bed available.





Complete smoking cessation/drug abuse cessation is strongly advised.








18 16:58

## 2018-06-26 NOTE — CARD
--------------- APPROVED REPORT --------------





EKG Measurement

Heart [id number]

MA [id number]

[id number]

[id number]

[id number]



<Conclusion>

Normal sinus rhythm

ST and T wave abnormality, consider anterolateral ischemia

Abnormal ECG

## 2018-06-26 NOTE — CP.PCM.CON
<Nidhi Haas - Last Filed: 18 13:09>





History of Present Illness





- History of Present Illness


History of Present Illness: 





PGY-2 for Dr. Merida





ICU consult: 4 vessel critically stenosis; ACS





Mr Henry, 58 AA M, with PMH hypertension, Prior MI in 2018 s/p cardiac 

arrest found to have L main critically stenosis with triple vessel disease, 

transferred to [facility] for 4-vessel CABG with 3 stents, (2018)

, came to ED today for chest pain. This AM while he was talking on phone with 

social security, he had a 10/10 chest pain that radiates to R chest associated 

with diaphoresis, nausea, dizziness, mild SOB. EMS gave him 324 Aspirin prior 

to emergency department arrival. EKG in ED showed TWI in V1, V2; ST depression 

in V4-V6, II, III, AVF. He was brought to the cath lab, received plavix 75 

prior to cath. Cardiac catherterization reveals all 4 grafts were critically 

stenosis, and among which 2/4 were completely blocked. R groin access, sheath 

intact, while transfer to ICU. Integrillin stopped prior to transfer out of 

cath lab. Currently pt is in ICU, with shealth intact at R groin. tridal gtt is 

on, heparin gtt, no chest pain 





ROS - Denies fever, chill, abdominal pain, vomiting, LOC, urinary bowel changes 

or other complaints. Free of chest pain at ICU now 





PMH


   CABG s/p cardiac arrest, HTN/HLD, On lifevest


   DM2


   Active smoking


   Heroine use





PSH    Exploratory laparotomy due to Rupture appendix


   CABG - 4 vessel; PLUS 3 stents, on lifevest


   1. In Situ L internal Mammary Artery to Mid LAD


   2. Vein graft to RCA


   3. Vein graft to Obtuse Marginal, 1st


   4. Vein graft to Diagonal, 1st





FH      Mom, DM; Dad- MI,  age 60





SH      Live with friend. 1ppd x  20 years; now since 2 months ago, to 1 

cigarette a day.  Denied ETOH. Heroine use "every other day"





All      NKDA





Med    Reviewed





PMD: Dr Shilpa Loco


Pharm: [facility]


Cardiology Dr. AMPARO Lantigua, [facility]





Past Patient History





- Infectious Disease


Hx of Infectious Diseases: None





- Past Social History


Smoking Status: Former Smoker





- CARDIAC


Hx Hypertension: Yes


Other/Comment: CABG, LIFEVEST





- PULMONARY


Hx Respiratory Disorders: Yes


Other/Comment: SMOKER





- NEUROLOGICAL


Hx Neurological Disorder: Yes





- HEENT


Hx HEENT Problems: No





- RENAL


Hx Chronic Kidney Disease: No





- ENDOCRINE/METABOLIC


Hx Diabetes Mellitus Type 2: Yes





- HEMATOLOGICAL/ONCOLOGICAL


Hx Hepatitis B: Yes





- INTEGUMENTARY


Hx Dermatological Problems: No





- MUSCULOSKELETAL/RHEUMATOLOGICAL


Hx Falls: Yes





- GASTROINTESTINAL


Other/Comment: ULCER





- GENITOURINARY/GYNECOLOGICAL


Hx Genitourinary Disorders: No





- PSYCHIATRIC


Hx Substance Use: No





- SURGICAL HISTORY


Hx Appendectomy: Yes


Hx Open Heart Surgery: Yes (CABG )





- ANESTHESIA


Hx Anesthesia: Yes


Hx Anesthesia Reactions: No


Hx Malignant Hyperthermia: No





Meds


Home Medications: 


 Home Medication List











 Medication  Instructions  Recorded  Confirmed  Type


 


Heparin Sod,Pork in 0.45% NaCl 25,000 unit IV DAILY 2 Days 18  Rx





[Heparin 25,000 Unit/250-1/2 Ns] iv.soln   


 


Insulin Human Regular-LOW [HumuLIN 0 units SC ACHS  ml 18  Rx





R LOW]    


 


Nitroglycerin 50mg in D5W 50 mg IV DAILY 2 Days  bag 18  Rx





[Nitroglycerin 50 mg/250 ml D5W]    











Allergies/Adverse Reactions: 


 Allergies











Allergy/AdvReac Type Severity Reaction Status Date / Time


 


No Known Allergies Allergy   Verified 18 20:55














- Medications


Medications: 


 Current Medications





Heparin Sodium/Sodium Chloride (Heparin 81532 Units/250ml 1/2 Normal Saline)  25

,000 units in 250 mls @ 7.512 mls/hr IV .Q24H TIFFANIE; 12 UNITS/KG/HR


   PRN Reason: Protocol


Nitroglycerin/Dextrose (Nitroglycerin 50 Mg/250 Ml D5w)  50 mg in 250 mls @ 1.5 

mls/hr IV .Q24H PRN; Protocol; 5 MCG/MIN


   PRN Reason: Pain, severe (8-10)











Physical Exam





- Constitutional


Appears: No Acute Distress





- Head Exam


Head Exam: ATRAUMATIC, NORMAL INSPECTION, NORMOCEPHALIC





- Eye Exam


Eye Exam: EOMI, Normal appearance, PERRL.  absent: Scleral icterus


Pupil Exam: NORMAL ACCOMODATION





- ENT Exam


ENT Exam: Mucous Membranes Moist





- Neck Exam


Additional comments: 





supple, No JVD





- Respiratory Exam


Respiratory Exam: Clear to Auscultation Bilateral.  absent: Rhonchi, Wheezes, 

Respiratory Distress





- Cardiovascular Exam


Cardiovascular Exam: REGULAR RHYTHM, +S1, +S2.  absent: Systolic Murmur





- GI/Abdominal Exam


GI & Abdominal Exam: Normal Bowel Sounds, Soft.  absent: Distended, Firm, 

Guarding, Rigid, Tenderness


Additional comments: 





midline incision healed well





- Extremities Exam


Extremities exam: Positive for: normal capillary refill, pedal pulses present.  

Negative for: calf tenderness





- Neurological Exam


Neurological exam: Alert, Oriented x3





- Psychiatric Exam


Psychiatric exam: Normal Affect, Normal Mood





- Skin


Skin Exam: Dry, Warm





Results





- Vital Signs


Recent Vital Signs: 


 Last Vital Signs











Temp  98.6 F   18 10:21


 


Pulse  68   18 10:21


 


Resp  18   18 10:21


 


BP  126/55 L  18 10:21


 


Pulse Ox  100   18 10:21














- Labs


Result Diagrams: 


 18 10:42





 18 10:35


Labs: 


 Laboratory Results - last 24 hr











  18





  10:35 10:35 10:35


 


WBC   


 


RBC   


 


Hgb   


 


Hct   


 


MCV   


 


MCH   


 


MCHC   


 


RDW   


 


Plt Count   


 


MPV   


 


Gran %   


 


Lymph % (Auto)   


 


Mono % (Auto)   


 


Eos % (Auto)   


 


Baso % (Auto)   


 


Gran #   


 


Lymph # (Auto)   


 


Mono # (Auto)   


 


Eos # (Auto)   


 


Baso # (Auto)   


 


PT  11.1  


 


INR  0.97  


 


APTT  29.7  


 


pO2    163 H


 


VBG pH    7.41


 


VBG pCO2    36.0 L


 


VBG HCO3    22.8


 


VBG Total CO2    23.9


 


VBG O2 Sat (Calc)    100.3 H


 


VBG Base Excess    -1.4 L


 


VBG Potassium    5.5 H


 


Sodium   140  136.0


 


Chloride   104  106.0


 


Glucose    143 H


 


Lactate    3.7 H


 


FiO2    21.0


 


Potassium   4.2 


 


Carbon Dioxide   24 


 


Anion Gap   16 


 


BUN   19 


 


Creatinine   0.7 L 


 


Est GFR ( Amer)   > 60 


 


Est GFR (Non-Af Amer)   > 60 


 


Random Glucose   124 H 


 


Calcium   9.0 


 


Total Bilirubin   0.2 


 


AST   51 


 


ALT   35 


 


Alkaline Phosphatase   113 


 


Lactate Dehydrogenase   316 L 


 


Total Creatine Kinase   90 


 


Troponin I   0.08  D 


 


Total Protein   7.1 


 


Albumin   3.9 


 


Globulin   3.2 


 


Albumin/Globulin Ratio   1.2 


 


Venous Blood Potassium    5.5 H


 


Blood Type   


 


Antibody Screen   


 


BBK History Checked   














  18





  10:42 11:00


 


WBC  11.6 H 


 


RBC  4.35 


 


Hgb  13.2 L 


 


Hct  38.7 L 


 


MCV  89.0  D 


 


MCH  30.3 


 


MCHC  34.1 


 


RDW  14.9 H 


 


Plt Count  338 


 


MPV  10.5 


 


Gran %  63.7 


 


Lymph % (Auto)  27.3 


 


Mono % (Auto)  7.1 H 


 


Eos % (Auto)  1.6 


 


Baso % (Auto)  0.3 


 


Gran #  7.37 H 


 


Lymph # (Auto)  3.2 


 


Mono # (Auto)  0.8 H 


 


Eos # (Auto)  0.2 


 


Baso # (Auto)  0.03 


 


PT  


 


INR  


 


APTT  


 


pO2  


 


VBG pH  


 


VBG pCO2  


 


VBG HCO3  


 


VBG Total CO2  


 


VBG O2 Sat (Calc)  


 


VBG Base Excess  


 


VBG Potassium  


 


Sodium  


 


Chloride  


 


Glucose  


 


Lactate  


 


FiO2  


 


Potassium  


 


Carbon Dioxide  


 


Anion Gap  


 


BUN  


 


Creatinine  


 


Est GFR ( Amer)  


 


Est GFR (Non-Af Amer)  


 


Random Glucose  


 


Calcium  


 


Total Bilirubin  


 


AST  


 


ALT  


 


Alkaline Phosphatase  


 


Lactate Dehydrogenase  


 


Total Creatine Kinase  


 


Troponin I  


 


Total Protein  


 


Albumin  


 


Globulin  


 


Albumin/Globulin Ratio  


 


Venous Blood Potassium  


 


Blood Type   A POSITIVE


 


Antibody Screen   Negative


 


BBK History Checked   No verified bt














Assessment & Plan





- Assessment and Plan (Free Text)


Plan: 





Inferior-anterior ACS, All 4 grafts critically stenosed


Hx 4-vessel CABG, s/p 3 Stents


Ischemic cardiomyopathy on lifevest and as primary prevention for sudden 

cardiac death





Instructions from Dr. Cherry:


   If he transfer today to [facility],


      1. Continue heparin gtt


      2. Transfer to [facility] with R femoral sheath


   If he stays overnight at [facility] ICU tonight


      1. Chest ACT


      2. If ACT goes below 180, remove R femoral sheath, apply manual pressure 

at least 30 minutes


      3. Stop heparin gtt


   Continue nitroglycerin gtt, hold if SBP < 90





Neuro - AAOx3, maintain nomothermia


Pulm - Maintain SaO2 above 92%. NC 2L O2 as needed


Card - Vascular check per protocol; 


   Off life vest; resuscitation pads is on with continual cardiac monitoring, 

per ICU protocol


   


   Serial EKG


GI - NPO in case he will do surgery


 - Monitor U/O. Replete electrolyte as needed.


Endo - Maintain blood glucose 140-180


Heme - See above 


ID - no issue


Prophylaxis - protonix





Dispo: EMTALA form is signed with accepting Dr Narayan at [facility], pending bed 

available to transfer to [facility] for possible surgery





s/r/d/w Dr. Merida








<GladysterezaChristopher - Last Filed: 18 07:14>





Meds





- Medications


Medications: 


 Current Medications





Heparin Sodium/Sodium Chloride (Heparin 28415 Units/250ml 1/2 Normal Saline)  25

,000 units in 250 mls @ 7.512 mls/hr IV .Q24H TIFFANIE; 12 UNITS/KG/HR


   PRN Reason: Protocol


Nitroglycerin/Dextrose (Nitroglycerin 50 Mg/250 Ml D5w)  50 mg in 250 mls @ 1.5 

mls/hr IV .Q24H PRN; Protocol; 5 MCG/MIN


   PRN Reason: Pain, severe (8-10)


Sodium Chloride (Sodium Chloride 0.9%)  1,000 mls @ 100 mls/hr IV .Q10H TIFFANIE


Insulin Human Regular (Humulin R Low)  0 units SC ACHS TIFFANIE


   PRN Reason: Protocol











Results





- Vital Signs


Recent Vital Signs: 


 Last Vital Signs











Temp  98 F   18 11:39


 


Pulse  70   18 13:08


 


Resp  18   18 13:08


 


BP  125/58 L  18 13:08


 


Pulse Ox  100   18 12:50














- Labs


Result Diagrams: 


 18 10:42





 18 10:35





Attending/Attestation





- Attestation


I have personally seen and examined this patient.: Yes


I have fully participated in the care of the patient.: Yes


I have reviewed all pertinent clinical information: Yes


Notes (Text): 





18 13:22


The patient was seen and examined at the bedside. 


Patient care was discussed with resident 


Medical records, lab studies were reviewed and management issues were discussed 

and formulated.


Agree with above treatment plans as outlined in 's note 


Continue management and monitoring as per cardiology team


Patient is being transferred to a CABG capable facility by cardio team

## 2018-06-26 NOTE — ED PDOC
Arrival/HPI





- General


Chief Complaint: Chest Pain


Time Seen by Provider: 06/26/18 10:40


Historian: Patient, EMS





- History of Present Illness


Narrative History of Present Illness (Text): 





06/26/18 10:41


58 year old male, whose PMH includes hypertension and CABG, who presents to the 

emergency department via EMS, complaining of left side chest pain/pressure 

prior to emergency department arrival while watching TV today. Patient reports 

this symptoms was associated with feeling nauseous, dizziness, and mild 

shortness of breath, as well as + diaphroetic. He states his chest pain have 

been ongoing over the last 3 weeks, intermittent at times and wax and wanes at 

times; pt had seen PMD recently and had workup performed but does not know the 

result. Patient decided to call EMT today due to severity of chest pain and 

received his Aspirin prior to emergency department arrival. Patient denies fever

, chills, abdominal pain, vomiting, LOC, urinary/bowel changes, bleeding, or 

other complaints. Pt denied other medical complaints.


pt is here for further eval.








PCP: Dr Lantigua?


pt received multiple cardiac care at [facility]


Patient had 3 vessel CABG and subsequently 3 stents March 2018 at [facility]. 


pt had complications from appendix/abd abscess, multiple abd surgeries





Time/Duration: Prior to Arrival, > week (3 weeks of intermittent chest pain)


Symptom Onset: Gradual


Symptom Course: Worsening


Quality: Tightness, Cramping


Severity Level: Severe


Activities at Onset: Rest


Context: Home





Past Medical History





- Provider Review


Nursing Documentation Reviewed: Yes





- Travel History


Have you recently traveled outside [place] w/in the past 3 mons?: No





- Past History


Past History: No Previous (recent hx of Cardiac arrest/NSTEMI (triple vessel 

bypass + 2 stents))





- Infectious Disease


Hx of Infectious Diseases: None





- Cardiac


Hx Hypertension: Yes


Other/Comment: CABG, LIFEVEST





- Pulmonary


Hx Respiratory Disorders: Yes


Other/Comment: SMOKER





- Neurological


Hx Neurological Disorder: Yes





- HEENT


Hx HEENT Disorder: No





- Renal


Hx Renal Disorder: No





- Endocrine/Metabolic


Hx Diabetes Mellitus Type 2: Yes





- Hematological/Oncological


Hx Hepatitis B: Yes





- Integumentary


Hx Dermatological Disorder: No





- Musculoskeletal/Rheumatological


Hx Falls: Yes





- Gastrointestinal


Other/Comment: ULCER





- Genitourinary/Gynecological


Hx Genitourinary Disorders: No





- Psychiatric


Hx Substance Use: No





- Surgical History


Hx Appendectomy: Yes


Hx Open Heart Surgery: Yes (CABG 2018)





Family/Social History





- Physician Review


Nursing Documentation Reviewed: Yes


Family/Social History: Unknown Family HX


Smoking Status: Former Smoker


Hx Alcohol Use: No


Hx Substance Use: No


Hx Substance Use Treatment: No





Allergies/Home Meds


Allergies/Adverse Reactions: 


Allergies





No Known Allergies Allergy (Verified 05/22/18 20:55)


 








Home Medications: 


 Home Meds











 Medication  Instructions  Recorded  Confirmed


 


Aspirin [Aspirin Chewable] 81 mg PO DAILY 05/25/18 05/25/18














Review of Systems





- Review of Systems


Constitutional: absent: Fevers


Eyes: Normal


ENT: absent: Sore Throat


Respiratory: SOB (mild ).  absent: Cough


Cardiovascular: Chest Pain (left sided ).  absent: Palpitations


Gastrointestinal: Nausea.  absent: Abdominal Pain, Vomiting


Genitourinary Male: absent: Dysuria


Musculoskeletal: absent: Arthralgias, Back Pain


Skin: absent: Rash


Neurological: Dizziness.  absent: Headache


Endocrine: Diaphoresis


Hemo/Lymphatic: Normal


Psychiatric: Normal





Physical Exam





- Physical Exam


Narrative Physical Exam (Text): 





06/26/18 


General: alert/awake, GCS = 15, oriented x 3, resting in bed, uncomfortable, 

cooperative, interactive; mild distress due to pain


Head: NC/AT


EYE: PERRLA, EOMI, sclera anicteric, no nystagmus, no photophobia; visual field 

intact b/l


Facial: WNL


Oral: uvula/tongue are midline, no exudate/lesions, no drooling/stridor, no 

dysphonia; poor dentitions; moist oral mucosa


NECK: intact ROM, no midline tenderness, no nuchal rigidity, no meningeal signs

; no step off


Chest: CTA b/l, no w/r/r; no tachypenia, no accessory muscle use noted


Chest Wall: no crepitus, no lesions, no gross deformities, no focal tenderness


Cardiac: +S1, +S2, no m/r/r, no tachycardia


Abdominal: +BS, soft/nd/nt, thin patient; no masses/rebound/guarding/rigidity; 

no powers's sign, no mcburney's point tenderness; pt with vertical abd surg 

scars (well healed)


Extremities: intact ROM, strength 5/5 grossly intact in all limbs, neurovasc 

intact b/l; + ambulatory; reflex +2/2


BACK: no step off, no midline tenderness, NO crepitus, no gross deformities 

noted; Intact ROM


SKIN: cap refill ~ 1 sec, no ulcerations, no petechiae, no rashes; mild pallor 

noted


NEURO: CNII-XII WNL, no facial asymmetries, no slurr speech, oriented x 3


NIH stroke scale ~ 0


Psych: normal insight, normal affect; follows command with ease 








Vital Signs Reviewed: Yes


Vital Signs











  Temp Pulse Resp BP Pulse Ox


 


 06/26/18 14:50   59 L  58 H   100


 


 06/26/18 14:40   61  16   99


 


 06/26/18 14:38   60  16  126/63 


 


 06/26/18 14:30   75  36 H  116/57 L  98


 


 06/26/18 14:23   69  18  109/54 L 


 


 06/26/18 14:20   59 L  35 H   99


 


 06/26/18 14:10   60  52 H   98


 


 06/26/18 14:08   66  16  106/54 L 


 


 06/26/18 14:00   66  66 H  110/50 L  97


 


 06/26/18 13:53   69  16  115/58 L 


 


 06/26/18 13:50   70  18   100


 


 06/26/18 13:40   69    100


 


 06/26/18 13:38   70  16  110/54 L 


 


 06/26/18 13:30   68  18  126/56 L  100


 


 06/26/18 13:23   68  16  110/50 L 


 


 06/26/18 13:20   59 L  29 H   100


 


 06/26/18 13:10   71  16   100


 


 06/26/18 13:08   70  18  125/58 L 


 


 06/26/18 13:00   68  25 H  131/77  100


 


 06/26/18 12:53   70  16  115/56 L 


 


 06/26/18 12:50   73  21   100


 


 06/26/18 12:40   68    100


 


 06/26/18 12:30   66   115/58 L  100


 


 06/26/18 12:25   65  15   99


 


 06/26/18 11:43   73  21  119/60 


 


 06/26/18 11:39  98 F  76  16  125/64 


 


 06/26/18 10:21  98.6 F  68  18  126/55 L  100











Temperature: Afebrile


Blood Pressure: Hypotensive


Pulse: Regular


Respiratory Rate: Normal


Appearance: Positive for: Well-Appearing, Uncomfortable.  No: Non-Toxic, Ill-

Appearing


Pain Distress: Mild


Mental Status: Positive for: Alert and Oriented X 3





- Systems Exam


Head: Present: Atraumatic, Normocephalic





Medical Decision Making


ED Course and Treatment: 





Impression:


58 year old male, with left sided chest pain associated with mild shortness of 

breath, nausea, and diaphoresis since prior to arrival.





I have considered all Differential Diagnosis regarding pt's chief medical 

complaints/clinical findings included but are not limited to:  likely cardiac 

ischemia, AMI





Plan:


-- EKG


-- Chest X-ray


-- Labs


-- Reassess and disposition





Progress Notes:





pt is currently resting in bed, with + chest pain


vital signs WNL





06/26/18 10:40


Upon obtaining EKG at 10:30, I spoke to Dr. Cherry who wanted to see the EKG 

himself. Sheela was able to present the EKG to him in the cath lab, at 

10:39 I spoke with Dr. Cherry and was told to start patient on Integrilin and 

activate CODE HEART. 





CODE heart activated





06/26/18 10:43


Dr. Cherry's cath team came to bedside of patient and took patient to cath lab 

for evaluation. Per Dr Cherry, did not want any medications given in the 

emergency department, will do so up at the lab and to just bring the patient 

upstairs for intervention





pt currently still has some chest pain/tightness


pt is made aware of his medical results


agrees with admission





06/26/18 10:48


Paging hospitalist on call for patient's admission. 





06/26/18 12:13


Case discussed with Dr. Freedman, who is aware of patient's condition and 

agrees to admission. 





Re-evaluation Time: 10:45


Reassessment Condition: Unchanged





- Critical Care


Critical Care Minutes: 45 minutes


Critical Care Time: Excluding Proc Time


Narrative Critical Care (Text): 





06/26/18 18:16


critical care time: 45min, excluding procedure time, excluding time teaching 

residents/students/mid-level providers; including initial eval/diagnosis, 

diagnostic interpretation, re-eval, consultations, final disposition








- Lab Interpretations


Lab Results: 








 06/26/18 10:42 





 06/26/18 10:35 





 Lab Results





06/26/18 11:45: Blood Type Confirm A POSITIVE


06/26/18 11:00: Blood Type A POSITIVE, Antibody Screen Negative, BBK History 

Checked No verified bt


06/26/18 10:42: WBC 11.6 H, RBC 4.35, Hgb 13.2 L, Hct 38.7 L, MCV 89.0  D, MCH 

30.3, MCHC 34.1, RDW 14.9 H, Plt Count 338, MPV 10.5, Gran % 63.7, Lymph % (Auto

) 27.3, Mono % (Auto) 7.1 H, Eos % (Auto) 1.6, Baso % (Auto) 0.3, Gran # 7.37 H

, Lymph # (Auto) 3.2, Mono # (Auto) 0.8 H, Eos # (Auto) 0.2, Baso # (Auto) 0.03


06/26/18 10:40: Phosphorus 2.6, Magnesium 1.9


06/26/18 10:35: pO2 163 H, VBG pH 7.41, VBG pCO2 36.0 L, VBG HCO3 22.8, VBG 

Total CO2 23.9, VBG O2 Sat (Calc) 100.3 H, VBG Base Excess -1.4 L, VBG 

Potassium 5.5 H, Sodium 136.0, Chloride 106.0, Glucose 143 H, Lactate 3.7 H, 

FiO2 21.0, Venous Blood Potassium 5.5 H


06/26/18 10:35: Sodium 140, Chloride 104, Potassium 4.2, Carbon Dioxide 24, 

Anion Gap 16, BUN 19, Creatinine 0.7 L, Est GFR ( Amer) > 60, Est GFR (

Non-Af Amer) > 60, Random Glucose 124 H, Calcium 9.0, Total Bilirubin 0.2, AST 

51, ALT 35, Alkaline Phosphatase 113, Lactate Dehydrogenase 316 L, Total 

Creatine Kinase 90, Troponin I 0.08  D, Total Protein 7.1, Albumin 3.9, 

Globulin 3.2, Albumin/Globulin Ratio 1.2


06/26/18 10:35: PT 11.1, INR 0.97, APTT 29.7








I have reviewed the lab results: Yes


Interpretation: Abnormal lab values (elevated lactic acid; abnl TROP)





- EKG Interpretation


EKG Interpretation (Text): 





06/26/18 


EKG:


Ordered, reviewed, and independently interpreted the EKG.


Rate : 70 BPM


Rhythm : NSR


Interpretation : Normal axis, No ST- depressions lead I, II, III, F as well as 

V4 to V6. ST-elevation aVR lead I, II; concern for acute MI; gross changes 

compared to old EKG. 


Comparison : May 2018





Interpreted by ED Physician: Yes


Type: 12 lead EKG


Comparison: Different from prev. EKG





- Medication Orders


Current Medication Orders: 








Heparin Sodium/Sodium Chloride (Heparin 34554 Units/250ml 1/2 Normal Saline)  25

,000 units in 250 mls @ 7.512 mls/hr IV .Q24H TIFFANIE; 12 UNITS/KG/HR


   PRN Reason: Protocol


Nitroglycerin/Dextrose (Nitroglycerin 50 Mg/250 Ml D5w)  50 mg in 250 mls @ 1.5 

mls/hr IV .Q24H PRN; Protocol; 5 MCG/MIN


   PRN Reason: Pain, severe (8-10)


Sodium Chloride (Sodium Chloride 0.9%)  1,000 mls @ 100 mls/hr IV .Q10H TIFFANIE


Insulin Human Regular (Humulin R Low)  0 units SC ACHS TIFFANIE


   PRN Reason: Protocol





Discontinued Medications





Eptifibatide (Integrilin Bolus)  11.3 mg 0.18 mg/kg (11.3 mg) IV ONCE ONE


   Stop: 06/26/18 10:43











- Scribe Statement


The provider has reviewed the documentation as recorded by the Eric Devries





Provider Scribe Attestation:


All medical record entries made by the Scribe were at my direction and 

personally dictated by me. I have reviewed the chart and agree that the record 

accurately reflects my personal performance of the history, physical exam, 

medical decision making, and the department course for this patient. I have 

also personally directed, reviewed, and agree with the discharge instructions 

and disposition. 





Disposition/Present on Arrival





- Present on Arrival


Any Indicators Present on Arrival: No


History of DVT/PE: No


History of Uncontrolled Diabetes: No


Urinary Catheter: No


History of Decub. Ulcer: No


History Surgical Site Infection Following: None





- Disposition


Have Diagnosis and Disposition been Completed?: Yes


Diagnosis: 


 Acute MI, Chest pain with high risk for cardiac etiology





Disposition: HOSPITALIZED


Disposition Time: 10:45


Patient Plan: Admission, ICU


Patient Problems: 


 Current Active Problems











Problem Status Onset


 


Acute MI Acute  











Condition: FAIR

## 2018-06-26 NOTE — CARDCATH
PROCEDURE DATE:  06/26/2018



EMERGENCY CARDIAC CATHETERIZATION



HISTORY:  The patient is a 58-year-old male who presents with a week of

stuttering chest discomfort.  Today, as his symptoms got worse, the patient

presented to the emergency room.



The patient's past medical history includes emergency coronary artery

bypass surgery in March of this year for left main critical stenosis as

well as V-fib arrest.



Since his discharge from [facility], the patient has been

noncompliant to cardiac risk reduction program.  He continues to use drugs

and is a question of compliance with medications.



Because of his acute coronary syndrome, the patient is brought up for

emergency cardiac catheterization.



PROCEDURE:  Emergency left heart catheterization with coronary

arteriography, left ventriculogram, LIMA angiogram, saphenous vein graft

angiogram.



The right femoral artery was cannulated with a 6-[place] sheath.  There were

no complications.



I performed moderate sedation which included the presence of an independent

trained observer that assisted in monitoring the patient's level of

consciousness and physiologic status.  After administration of Versed and

fentanyl, my intra-service time was 15 minutes.



Findings on catheterization revealed left ventricle that contracted

normally.



The left main artery was occluded.



The ostium of the circumflex artery was occluded.



The saphenous vein graft to the OM was occluded.  The saphenous vein graft

to the diagonal vessel was occluded.  The LIMA to the LAD was found to be

patent and provided good right antegrade flow.  There is a 90% stenosis at

the anastomotic site of the LIMA to the LAD.  The saphenous vein graft to

the RCA revealed a 90% stenosis at the anastomotic site of the RCA.



Femoral arteriogram _____ revealed severe peripheral vascular disease.  The

sheath was left in place.



The patient tolerated the procedure well.



In summary, the procedure revealed normal LV function.  Subtotally occluded

left main artery with an occluded ostium of the left main, subtotally

occluded ostium of the circumflex artery.  Occluded saphenous vein graft to

the OM, occluded saphenous vein graft to the diagonal vessel, critical

lesion in the anastomotic site of the LIMA to the LAD and a critical

anastomotic site lesion to the RCA.



Given these findings, the patient will be transferred back to _____ for an

emergency CABG.





__________________________________________

Schuyler Cherry MD



DD:  06/26/2018 11:42:23

DT:  06/26/2018 11:45:34

Job # [id number]

## 2018-06-26 NOTE — CARD
--------------- APPROVED REPORT --------------





EKG Measurement

Heart [id number]

[id number]

[id number]

[id number]

[id number]



<Conclusion>

Normal sinus rhythm

Possible Left atrial enlargement

Marked ST abnormality, possible inferior subendocardial injury

Marked ST abnormality, possible anterolateral subendocardial injury

Abnormal ECG

## 2018-06-26 NOTE — CP.PCM.PCO
Addendum


Addendum: 





06/26/18 11:24





59 yo M presented to the ER for chest pain and dizziness. Code heart called in 

the ER due to signs of lateral wall ischemia on EKG per cardiologist. Patient 

was given aspirin and nitroglycerin in the field, unknown dose. Patient was 

placed on telemetry monitor and O2 by nasal cannula and transferred to the cath 

lab, where Dr. Cherry was waiting. Patient was given a dose of plavix 75mg PO and 

wheeled into the cath lab. Care transferred to Dr. Cherry and cardiac cath team. 

No adverse events during transfer.